# Patient Record
Sex: MALE | Race: WHITE | NOT HISPANIC OR LATINO | Employment: FULL TIME | URBAN - METROPOLITAN AREA
[De-identification: names, ages, dates, MRNs, and addresses within clinical notes are randomized per-mention and may not be internally consistent; named-entity substitution may affect disease eponyms.]

---

## 2020-09-26 ENCOUNTER — HOSPITAL ENCOUNTER (OUTPATIENT)
Dept: TELEMEDICINE | Facility: HOSPITAL | Age: 55
Discharge: HOME | End: 2020-09-26
Payer: COMMERCIAL

## 2020-09-26 ENCOUNTER — HOSPITAL ENCOUNTER (INPATIENT)
Facility: HOSPITAL | Age: 55
LOS: 2 days | Discharge: HOME | DRG: 065 | End: 2020-09-28
Attending: EMERGENCY MEDICINE | Admitting: ANESTHESIOLOGY
Payer: COMMERCIAL

## 2020-09-26 DIAGNOSIS — I63.9 STROKE: ICD-10-CM

## 2020-09-26 DIAGNOSIS — I63.311 CEREBROVASCULAR ACCIDENT (CVA) DUE TO THROMBOSIS OF RIGHT MIDDLE CEREBRAL ARTERY: ICD-10-CM

## 2020-09-26 DIAGNOSIS — I63.9 CEREBROVASCULAR ACCIDENT (CVA), UNSPECIFIED MECHANISM: Primary | ICD-10-CM

## 2020-09-26 DIAGNOSIS — I63.411 EMBOLIC STROKE INVOLVING RIGHT MIDDLE CEREBRAL ARTERY: ICD-10-CM

## 2020-09-26 PROBLEM — E78.5 HYPERLIPEMIA: Status: ACTIVE | Noted: 2020-09-26

## 2020-09-26 LAB
ABO + RH BLD: NORMAL
ALBUMIN SERPL BCP-MCNC: 3.7 G/DL (ref 3.5–5.2)
ALLENS TEST: ABNORMAL
ALP SERPL-CCNC: 62 U/L (ref 55–135)
ALT SERPL W/O P-5'-P-CCNC: 23 U/L (ref 10–44)
ANION GAP SERPL CALC-SCNC: 10 MMOL/L (ref 8–16)
AST SERPL-CCNC: 20 U/L (ref 10–40)
BASOPHILS # BLD AUTO: 0.03 K/UL (ref 0–0.2)
BASOPHILS NFR BLD: 0.3 % (ref 0–1.9)
BILIRUB SERPL-MCNC: 0.5 MG/DL (ref 0.1–1)
BILIRUB UR QL STRIP: NEGATIVE
BLD GP AB SCN CELLS X3 SERPL QL: NORMAL
BUN SERPL-MCNC: 17 MG/DL (ref 6–20)
CALCIUM SERPL-MCNC: 8.6 MG/DL (ref 8.7–10.5)
CHLORIDE SERPL-SCNC: 105 MMOL/L (ref 95–110)
CHOLEST SERPL-MCNC: 239 MG/DL (ref 120–199)
CHOLEST/HDLC SERPL: 5 {RATIO} (ref 2–5)
CLARITY UR REFRACT.AUTO: CLEAR
CO2 SERPL-SCNC: 25 MMOL/L (ref 23–29)
COLOR UR AUTO: NORMAL
CREAT SERPL-MCNC: 1.1 MG/DL (ref 0.5–1.4)
CREAT SERPL-MCNC: 1.1 MG/DL (ref 0.5–1.4)
CTP QC/QA: YES
DIFFERENTIAL METHOD: ABNORMAL
EOSINOPHIL # BLD AUTO: 0 K/UL (ref 0–0.5)
EOSINOPHIL NFR BLD: 0 % (ref 0–8)
ERYTHROCYTE [DISTWIDTH] IN BLOOD BY AUTOMATED COUNT: 12.9 % (ref 11.5–14.5)
EST. GFR  (AFRICAN AMERICAN): >60 ML/MIN/1.73 M^2
EST. GFR  (NON AFRICAN AMERICAN): >60 ML/MIN/1.73 M^2
ESTIMATED AVG GLUCOSE: 105 MG/DL (ref 68–131)
GLUCOSE SERPL-MCNC: 103 MG/DL (ref 70–110)
GLUCOSE UR QL STRIP: NEGATIVE
HBA1C MFR BLD HPLC: 5.3 % (ref 4–5.6)
HCO3 UR-SCNC: 27.3 MMOL/L (ref 24–28)
HCT VFR BLD AUTO: 44.5 % (ref 40–54)
HDLC SERPL-MCNC: 48 MG/DL (ref 40–75)
HDLC SERPL: 20.1 % (ref 20–50)
HGB BLD-MCNC: 14.5 G/DL (ref 14–18)
HGB UR QL STRIP: NEGATIVE
IMM GRANULOCYTES # BLD AUTO: 0.03 K/UL (ref 0–0.04)
IMM GRANULOCYTES NFR BLD AUTO: 0.3 % (ref 0–0.5)
INR PPP: 1.1 (ref 0.8–1.2)
KETONES UR QL STRIP: NEGATIVE
LDLC SERPL CALC-MCNC: 166 MG/DL (ref 63–159)
LEUKOCYTE ESTERASE UR QL STRIP: NEGATIVE
LYMPHOCYTES # BLD AUTO: 1.8 K/UL (ref 1–4.8)
LYMPHOCYTES NFR BLD: 15.5 % (ref 18–48)
MCH RBC QN AUTO: 30.2 PG (ref 27–31)
MCHC RBC AUTO-ENTMCNC: 32.6 G/DL (ref 32–36)
MCV RBC AUTO: 93 FL (ref 82–98)
MONOCYTES # BLD AUTO: 0.4 K/UL (ref 0.3–1)
MONOCYTES NFR BLD: 3.1 % (ref 4–15)
NEUTROPHILS # BLD AUTO: 9.3 K/UL (ref 1.8–7.7)
NEUTROPHILS NFR BLD: 80.8 % (ref 38–73)
NITRITE UR QL STRIP: NEGATIVE
NONHDLC SERPL-MCNC: 191 MG/DL
NRBC BLD-RTO: 0 /100 WBC
PCO2 BLDA: 44.2 MMHG (ref 35–45)
PH SMN: 7.4 [PH] (ref 7.35–7.45)
PH UR STRIP: 7 [PH] (ref 5–8)
PLATELET # BLD AUTO: 271 K/UL (ref 150–350)
PMV BLD AUTO: 10.3 FL (ref 9.2–12.9)
PO2 BLDA: 32 MMHG (ref 40–60)
POC BE: 3 MMOL/L
POC PTINR: 1 (ref 0.9–1.2)
POC PTWBT: 11.9 SEC (ref 9.7–14.3)
POC SATURATED O2: 60 % (ref 95–100)
POC TCO2: 29 MMOL/L (ref 24–29)
POCT GLUCOSE: 74 MG/DL (ref 70–110)
POCT GLUCOSE: 78 MG/DL (ref 70–110)
POTASSIUM SERPL-SCNC: 3.8 MMOL/L (ref 3.5–5.1)
PROT SERPL-MCNC: 7.1 G/DL (ref 6–8.4)
PROT UR QL STRIP: NEGATIVE
PROTHROMBIN TIME: 12.3 SEC (ref 9–12.5)
RBC # BLD AUTO: 4.8 M/UL (ref 4.6–6.2)
SAMPLE: ABNORMAL
SAMPLE: NORMAL
SAMPLE: NORMAL
SARS-COV-2 RDRP RESP QL NAA+PROBE: NEGATIVE
SITE: ABNORMAL
SODIUM SERPL-SCNC: 140 MMOL/L (ref 136–145)
SP GR UR STRIP: 1.01 (ref 1–1.03)
TRIGL SERPL-MCNC: 125 MG/DL (ref 30–150)
TSH SERPL DL<=0.005 MIU/L-ACNC: 1.7 UIU/ML (ref 0.4–4)
URN SPEC COLLECT METH UR: NORMAL
WBC # BLD AUTO: 11.43 K/UL (ref 3.9–12.7)

## 2020-09-26 PROCEDURE — 80061 LIPID PANEL: CPT

## 2020-09-26 PROCEDURE — U0002 COVID-19 LAB TEST NON-CDC: HCPCS | Performed by: EMERGENCY MEDICINE

## 2020-09-26 PROCEDURE — 81003 URINALYSIS AUTO W/O SCOPE: CPT

## 2020-09-26 PROCEDURE — 12000002 HC ACUTE/MED SURGE SEMI-PRIVATE ROOM

## 2020-09-26 PROCEDURE — 85610 PROTHROMBIN TIME: CPT

## 2020-09-26 PROCEDURE — 82803 BLOOD GASES ANY COMBINATION: CPT

## 2020-09-26 PROCEDURE — 85025 COMPLETE CBC W/AUTO DIFF WBC: CPT

## 2020-09-26 PROCEDURE — 93005 ELECTROCARDIOGRAM TRACING: CPT

## 2020-09-26 PROCEDURE — 99291 CRITICAL CARE FIRST HOUR: CPT | Mod: ,,, | Performed by: EMERGENCY MEDICINE

## 2020-09-26 PROCEDURE — G0427 PR INPT TELEHEALTH CON 70/>M: ICD-10-PCS | Mod: GT,ICN,, | Performed by: PSYCHIATRY & NEUROLOGY

## 2020-09-26 PROCEDURE — 82565 ASSAY OF CREATININE: CPT

## 2020-09-26 PROCEDURE — G0427 INPT/ED TELECONSULT70: HCPCS | Mod: GT,ICN,, | Performed by: PSYCHIATRY & NEUROLOGY

## 2020-09-26 PROCEDURE — 99291 CRITICAL CARE FIRST HOUR: CPT | Mod: 25

## 2020-09-26 PROCEDURE — 82962 GLUCOSE BLOOD TEST: CPT

## 2020-09-26 PROCEDURE — 93010 ELECTROCARDIOGRAM REPORT: CPT | Mod: ,,, | Performed by: INTERNAL MEDICINE

## 2020-09-26 PROCEDURE — 25000003 PHARM REV CODE 250: Performed by: STUDENT IN AN ORGANIZED HEALTH CARE EDUCATION/TRAINING PROGRAM

## 2020-09-26 PROCEDURE — 99223 PR INITIAL HOSPITAL CARE,LEVL III: ICD-10-PCS | Mod: ,,, | Performed by: PSYCHIATRY & NEUROLOGY

## 2020-09-26 PROCEDURE — 80053 COMPREHEN METABOLIC PANEL: CPT

## 2020-09-26 PROCEDURE — 99223 1ST HOSP IP/OBS HIGH 75: CPT | Mod: ,,, | Performed by: PSYCHIATRY & NEUROLOGY

## 2020-09-26 PROCEDURE — 84443 ASSAY THYROID STIM HORMONE: CPT

## 2020-09-26 PROCEDURE — 99291 PR CRITICAL CARE, E/M 30-74 MINUTES: ICD-10-PCS | Mod: ,,, | Performed by: EMERGENCY MEDICINE

## 2020-09-26 PROCEDURE — 86901 BLOOD TYPING SEROLOGIC RH(D): CPT

## 2020-09-26 PROCEDURE — 83036 HEMOGLOBIN GLYCOSYLATED A1C: CPT

## 2020-09-26 PROCEDURE — 93010 EKG 12-LEAD: ICD-10-PCS | Mod: ,,, | Performed by: INTERNAL MEDICINE

## 2020-09-26 RX ORDER — SODIUM CHLORIDE 0.9 % (FLUSH) 0.9 %
10 SYRINGE (ML) INJECTION
Status: DISCONTINUED | OUTPATIENT
Start: 2020-09-26 | End: 2020-09-28 | Stop reason: HOSPADM

## 2020-09-26 RX ORDER — ATORVASTATIN CALCIUM 20 MG/1
40 TABLET, FILM COATED ORAL DAILY
Status: DISCONTINUED | OUTPATIENT
Start: 2020-09-26 | End: 2020-09-28 | Stop reason: HOSPADM

## 2020-09-26 RX ORDER — ONDANSETRON 2 MG/ML
4 INJECTION INTRAMUSCULAR; INTRAVENOUS EVERY 8 HOURS PRN
Status: DISCONTINUED | OUTPATIENT
Start: 2020-09-26 | End: 2020-09-28 | Stop reason: HOSPADM

## 2020-09-26 RX ORDER — SODIUM,POTASSIUM PHOSPHATES 280-250MG
2 POWDER IN PACKET (EA) ORAL
Status: DISCONTINUED | OUTPATIENT
Start: 2020-09-26 | End: 2020-09-28

## 2020-09-26 RX ORDER — AMOXICILLIN 250 MG
1 CAPSULE ORAL 2 TIMES DAILY
Status: DISCONTINUED | OUTPATIENT
Start: 2020-09-26 | End: 2020-09-28 | Stop reason: HOSPADM

## 2020-09-26 RX ORDER — LANOLIN ALCOHOL/MO/W.PET/CERES
800 CREAM (GRAM) TOPICAL
Status: DISCONTINUED | OUTPATIENT
Start: 2020-09-26 | End: 2020-09-28

## 2020-09-26 RX ORDER — SODIUM CHLORIDE 9 MG/ML
INJECTION, SOLUTION INTRAVENOUS CONTINUOUS
Status: DISCONTINUED | OUTPATIENT
Start: 2020-09-26 | End: 2020-09-27

## 2020-09-26 RX ORDER — POTASSIUM CHLORIDE 1.5 G/1.58G
60 POWDER, FOR SOLUTION ORAL
Status: DISCONTINUED | OUTPATIENT
Start: 2020-09-26 | End: 2020-09-28

## 2020-09-26 RX ADMIN — SODIUM CHLORIDE: 0.9 INJECTION, SOLUTION INTRAVENOUS at 02:09

## 2020-09-26 RX ADMIN — ATORVASTATIN CALCIUM 40 MG: 20 TABLET, FILM COATED ORAL at 02:09

## 2020-09-26 RX ADMIN — SODIUM CHLORIDE 1000 ML: 0.9 INJECTION, SOLUTION INTRAVENOUS at 02:09

## 2020-09-26 RX ADMIN — DOCUSATE SODIUM 50MG AND SENNOSIDES 8.6MG 1 TABLET: 8.6; 5 TABLET, FILM COATED ORAL at 09:09

## 2020-09-26 NOTE — SUBJECTIVE & OBJECTIVE
Woke up with symptoms?: no    Recent bleeding noted: no  Does the patient take any Blood Thinners? no  Medications: No relevant medications      Past Medical History: no relevant history    Past Surgical History: no relevant surgical history    Family History: no relevant history    Social History: no smoking, no drinking, no drugs    Allergies:  No known drug allergies    Review of Systems   Neurological: Positive for facial asymmetry, speech difficulty and weakness.   All other systems reviewed and are negative.    Objective:   Vitals: There were no vitals taken for this visit. BP: 142/73, Respiratory Rate: 16 and Heart Rate: 105    CT READ: Yes  No hemmorhage. No mass effect. No early infarct signs. Scans noot available on PACS. Report read back per ED physician. Also, CTA reported as right MCA occlusion    Physical Exam  Constitutional:       Appearance: Normal appearance.   HENT:      Head: Normocephalic and atraumatic.   Eyes:      Extraocular Movements: Extraocular movements intact.      Pupils: Pupils are equal, round, and reactive to light.   Neurological:      Mental Status: He is alert and oriented to person, place, and time.

## 2020-09-26 NOTE — HPI
Was at work with sudden onset of left sided weakness, facial droop and slurred speech. Symptom onset 730am.     PMHx  None

## 2020-09-26 NOTE — ED NOTES
Patient tolerated regular diet well. Wife at bedside. Denies any requests. HOB elevated and side rails up x2. Continuous pulse oximetry, BP, and cardiac monitoring in place. Call bell within reach. Will continue to monitor.

## 2020-09-26 NOTE — SUBJECTIVE & OBJECTIVE
Past Medical History:   Diagnosis Date    Cancer     skin     Past Surgical History:   Procedure Laterality Date    SKIN CANCER EXCISION        No current facility-administered medications on file prior to encounter.      No current outpatient medications on file prior to encounter.      Allergies: Patient has no known allergies.    History reviewed. No pertinent family history.  Social History     Tobacco Use    Smoking status: Never Smoker    Smokeless tobacco: Never Used   Substance Use Topics    Alcohol use: Yes     Comment: beer socially    Drug use: Never     Review of Systems   Constitutional: Negative for chills and fever.   HENT: Negative for congestion and sore throat.    Eyes: Negative for visual disturbance.   Respiratory: Negative for cough and shortness of breath.    Cardiovascular: Negative for chest pain and palpitations.   Gastrointestinal: Negative for abdominal pain, nausea and vomiting.   Endocrine: Negative for polyuria.   Genitourinary: Negative for dysuria, frequency and urgency.   Musculoskeletal: Negative for arthralgias and myalgias.   Skin: Negative for wound.   Neurological: Negative for headaches.   Psychiatric/Behavioral: The patient is not nervous/anxious.      Objective:     Vitals:    Temp: 98.2 °F (36.8 °C)  Pulse: 96  BP: (!) 146/70  Resp: 16  SpO2: 96 %  O2 Device (Oxygen Therapy): room air    Temp  Min: 98.2 °F (36.8 °C)  Max: 98.8 °F (37.1 °C)  Pulse  Min: 93  Max: 102  BP  Min: 131/65  Max: 160/74  Resp  Min: 16  Max: 20  SpO2  Min: 96 %  Max: 99 %    No intake/output data recorded.           Physical Exam  Constitutional:       General: He is not in acute distress.     Appearance: He is obese. He is not ill-appearing, toxic-appearing or diaphoretic.   HENT:      Head: Normocephalic and atraumatic.      Mouth/Throat:      Mouth: Mucous membranes are moist.      Pharynx: Oropharynx is clear.   Eyes:      General: No scleral icterus.     Pupils: Pupils are equal, round, and  reactive to light.   Neck:      Musculoskeletal: Normal range of motion. No neck rigidity.   Cardiovascular:      Rate and Rhythm: Normal rate and regular rhythm.   Pulmonary:      Effort: Pulmonary effort is normal. No respiratory distress.   Abdominal:      General: Abdomen is flat. There is no distension.   Musculoskeletal:         General: No signs of injury.   Skin:     General: Skin is warm and dry.      Coloration: Skin is not jaundiced.   Neurological:      General: No focal deficit present.      Mental Status: He is alert and oriented to person, place, and time. Mental status is at baseline.      GCS: GCS eye subscore is 4. GCS verbal subscore is 5. GCS motor subscore is 6.      Cranial Nerves: Facial asymmetry present.      Motor: Motor function is intact.      Coordination: Coordination is intact.      Comments: Mild left facial droop  CN VII palsy, all other CN in tact.      Psychiatric:         Mood and Affect: Mood normal.           Today I personally reviewed pertinent imaging, laboratory results, notably:  Hypercholesterolemia

## 2020-09-26 NOTE — ASSESSMENT & PLAN NOTE
55 y.o. yo male with PMHx skin cancer and HLD who presented to [facility] with left side weakness, left facial droop, and dysarthria. LKN 0730. Telemedicine with Dr. Paz ; tPA given. CTA reviewed images as acquired. CTA completed at the outside hospital with possible RM1 occlusion. Patient transferred to [facility] to be evaluated for possible IR intervention. Upon arrival to [facility] ED, patient with improvement in symptoms. NIH SS 1. CT scan completed with early ischemic changes in right lateral frontal lobe and operculum. Given mild symptoms and early changes on CT- will defer intervention unless patient declines.  Admitted to Vascular Neurology for close monitoring/higher level of care.     Antithrombotics for secondary stroke prevention: Antiplatelets: None: Hold all Antithrombotics x 24 hours after IV t-PA administration    Statins for secondary stroke prevention and hyperlipidemia, if present:   Statins: Atorvastatin- 40 mg daily    Aggressive risk factor modification: history of cancer     Rehab efforts: The patient has been evaluated by a stroke team provider and the therapy needs have been fully considered based off the presenting complaints and exam findings. The following therapy evaluations are needed: PT evaluate and treat, OT evaluate and treat, SLP evaluate and treat, PM&R evaluate for appropriate placement    Diagnostics ordered/pending: HgbA1C to assess blood glucose levels, Lipid Profile to assess cholesterol levels, MRI head without contrast to assess brain parenchyma, TTE to assess cardiac function/status , TSH to assess thyroid function    VTE prophylaxis: None: Reason for No Pharmacological VTE Prophylaxis: Holding x 24 hours s/p treatment with alteplase (tPA)    BP parameters: Infarct: Post tPA, SBP <180

## 2020-09-26 NOTE — ED NOTES
Patient passed IZZY screen. Continuous pulse oximetry, BP, and cardiac monitoring in place. Call bell within reach. Will continue to monitor.

## 2020-09-26 NOTE — CARE UPDATE
FLIGHT CARE TRANSPORT NOTE     Date of Transport: 2020  : 1965  Age: 55 y.o.  Medication Dosing Weight: 93.9kg   Sex: male  Race: White    MRN: [id number]  Time Of Patient Handoff: 1210    ASSESSMENT/INTERVENTIONS     This patient was transported by [facility] from the ED of [facility] by [facility]. The patient's overall condition remained unchanged throughout transport, with all vital signs remaining stable per the patient's current baseline. All lines, tubes, and devices remained patent and intact. The patient was transferred from the [facility] Care stretcher to ER bed 10 where care was transitioned to bedside Fiona PARKER without incident. The patient brought no personal belongings and OSH Chart and Diagnostic Disk(s) were left with receiving clinician.     tPA completed at 1018 per referring facility.    Vital signs upon departure of flight team:  HR: 92 NSR  BP: 131/65 (87)  SpO2: 98 RA  RR: 17        FOLLOW-UP     Call [facility], Evelyn Liz RN at [phone] for additional questions or concerns.

## 2020-09-26 NOTE — ED NOTES
Patient presents via [facility] from [facility] for evaluation of stroke. Reported M1 Occlusion/ JESSENIA occlusion.   tPA given: bolus: 8.5mg - 0920                   Infusion - 76 mg - 0922 - 1018 stop.  Patient has resolved slurred speech, expressive aphasia. Left-sided droop still present.

## 2020-09-26 NOTE — ED NOTES
Food tray delivered. Wife at bedside. HOB elevated and side rails up x2. Continuous pulse oximetry, BP, and cardiac monitoring in place. Call bell within reach. Will continue to monitor.

## 2020-09-26 NOTE — ED PROVIDER NOTES
Encounter Date: 9/26/2020       History     Chief Complaint   Patient presents with    Cerebrovascular Accident     Pt is a transfer from [facility] for evaluation of CVA. LKN 0715 this AM. presented with Aphasia, slurred speech and facial droop. TPA end at 10:18am     HPI   55-year-old male with past medical history of skin cancer, coming in sent in from outside hospital for CVA.  Last known well was 715 this morning.  Came in with slurred speech, left facial droop, left-sided weakness.  No numbness.  You are given tPA at 10:18 a.m. in transfer to [facility] for further evaluation, possible intervention.  CT scan of the brain showed a possible dense MCA sign on the right.  Upon arrival to the emergency department he feels like his speech is significantly improved, his weakness seems to have resolved.  Denies chest pain, shortness of breath, abdominal pain headache or any other symptoms at this time.    Review of patient's allergies indicates:  No Known Allergies  Past Medical History:   Diagnosis Date    Cancer     skin     Past Surgical History:   Procedure Laterality Date    SKIN CANCER EXCISION       History reviewed. No pertinent family history.  Social History     Tobacco Use    Smoking status: Never Smoker    Smokeless tobacco: Never Used   Substance Use Topics    Alcohol use: Yes     Comment: beer socially    Drug use: Never     Review of Systems  Constitutional:  No Fever, No Chills,   Eyes: No Vision Changes  ENT/Mouth: No sore throat, No rhinorrhea  Cardiovascular:  No Chest Pain, No Palpitations  Respiratory:  No Cough, No SOB  Gastrointestinal:  No Nausea, No Vomiting, No Diarrhea, No abdo pain.  Genitourinary:  No  pain, No dysuria   Musculoskeletal:  No Arthralgias, No Back Pain, No Neck Pain, No recent trauma.  Skin:  No skin Lesions  Neuro:  Mildly slurred speech, No Numbness, No Paresthesias, No Dizziness, No Headache        Physical Exam     Initial Vitals   BP Pulse Resp  Temp SpO2   09/26/20 1214 09/26/20 1214 09/26/20 1214 09/26/20 1220 09/26/20 1214   131/65 93 18 98.2 °F (36.8 °C) 97 %      MAP       --                Physical Exam  Physical Exam:  CONSTITUTIONAL: Well developed, well nourished, in no acute distress.  HENT: Normocephalic, atraumatic    EYES: Sclerae anicteric   NECK: Supple, no thyroid enlargement  CARDIOVASCULAR: Regular rate and rhythm without any murmurs, gallops, rubs.  RESPIRATORY: Speaking in full sentences. Breathing comfortably. Auscultation of the lungs revealed normal breath sounds b/l, no wheezing, no rales, no rhonchi.  ABDOMEN: Soft and nontender, no masses, no rebound or guarding   NEUROLOGIC: Alert, interacting normally.  Subtle left facial droop.  Mildly slurred speech.  5/5 strength bilaterally upper and lower extremities.  MSK: Moving all four extremities.  Skin: Warm and dry. No visible rash on exposed areas of skin.    Psych: Mood and affect normal.       ED Course   Procedures  Labs Reviewed   CBC W/ AUTO DIFFERENTIAL - Abnormal; Notable for the following components:       Result Value    Gran # (ANC) 9.3 (*)     Gran% 80.8 (*)     Lymph% 15.5 (*)     Mono% 3.1 (*)     All other components within normal limits   COMPREHENSIVE METABOLIC PANEL - Abnormal; Notable for the following components:    Calcium 8.6 (*)     All other components within normal limits   LIPID PANEL - Abnormal; Notable for the following components:    Cholesterol 239 (*)     LDL Cholesterol 166.0 (*)     All other components within normal limits   ISTAT PROCEDURE - Abnormal; Notable for the following components:    POC PO2 32 (*)     POC SATURATED O2 60 (*)     All other components within normal limits   PROTIME-INR   TSH   URINALYSIS, REFLEX TO URINE CULTURE    Narrative:     Specimen Source->Urine   HEMOGLOBIN A1C   SARS-COV-2 RDRP GENE   TYPE & SCREEN   ISTAT CREATININE   ISTAT PROCEDURE   POCT GLUCOSE   POCT GLUCOSE   POCT GLUCOSE MONITORING CONTINUOUS          Imaging  Results           CT Head Without Contrast (Final result)  Result time 09/26/20 13:26:02    Final result by Kaleb Roth MD (09/26/20 13:26:02)                 Impression:      Subtle hypoattenuation in the right lateral frontal lobe and operculum suspicious for early ischemic changes.  Recommend MRI brain for further evaluation.    Findings were discussed with Aissatou Fernández at 12:25 on9/26/2020 by Radiology resident Jayshree Richter MD.    This report was flagged in Epic as abnormal.    Electronically signed by resident: Jayshree Richter  Date:    09/26/2020  Time:    12:19    Electronically signed by: Kaleb Roth MD  Date:    09/26/2020  Time:    13:26             Narrative:    EXAMINATION:  CT HEAD WITHOUT CONTRAST    CLINICAL HISTORY:  stroke;    TECHNIQUE:  Low dose axial CT images obtained throughout the head without the use of intravenous contrast.  Axial, sagittal and coronal reconstructions were performed.    COMPARISON:  None.    FINDINGS:  Intracranial compartment:    Ventricles and sulci are normal in size for age without evidence of hydrocephalus.    There is subtle hypoattenuation in the right lateral frontal lobe and operculum suspicious for early ischemic changes.  There are also few scattered hypodensities in the supratentorial white matter, likely sequela of chronic microvascular disease.  No parenchymal mass, hemorrhage or edema.    No extra-axial blood or fluid collections.    Skull/extracranial contents (limited evaluation):    No fracture. Mastoid air cells and paranasal sinuses are essentially clear.                                 Medical Decision Making:   History:   Old Medical Records: I decided to obtain old medical records.    55-year-old male with past medical history as noted coming in with improving left-sided weakness and slurred speech since receiving tPA.  Exam as noted above positive for some mild left-sided facial droop as well as some slurred speech.  Repeat CT done in  the emergency department.  Vascular neuro at bedside and evaluate patient.  He is not vascular intervention candidate at this time.  You will be admitted to Neuro ICU for close monitoring, serial neuro checks, and continued care and management.    Rest of exam is otherwise benign.  Stroke labs ordered.    This was explained to patient, verbalizes understanding of plan.    Critical Care Time:     The high probability of sudden, clinically significant deterioration in the patient's condition required the highest level of my preparedness to intervene urgently.    Services included the following: chart data review, reviewing nursing notes and/or old charts, documentation time, consultant collaboration regarding findings and treatment options, medication orders and management, direct patient care, vital sign assessments and ordering, interpreting and reviewing diagnostic studies/lab tests.     I spent 30 minutes on total Critical Care time, which includes only time during which I was engaged in work directly related to the patient's care, as described above, whether at the bedside or elsewhere in the Emergency Department.  It did not include time spent on separately billable procedures nor did it include the time spent by residents, students, nurses or physician assistants on this patient's care.    Critical Care was needed secondary to the following conditions: Stroke.                                Clinical Impression:     ICD-10-CM ICD-9-CM   1. Cerebrovascular accident (CVA), unspecified mechanism  I63.9 434.91   2. Stroke  I63.9 434.91                          ED Disposition Condition    Admit                             Franko Obrien MD  09/26/20 [id number]

## 2020-09-26 NOTE — CONSULTS
[facility]  Vascular Neurology  Comprehensive Stroke Center  Tele-Consultation Note      Consults    Consulting Provider: ROLANDO BALDWIN  Current Providers  No providers found    Patient [facility] Department  Spoke hospital nurse at bedside with patient assisting consultant.     Patient information was obtained from patient and spouse/SO.         Assessment/Plan:     STROKE DOCUMENTATION     Acute Stroke Times:   Acute Stroke Times   Symptom Onset Date: 09/26/20  Symptom Onset Time: 0730  Stroke Team Arrival Date: 09/26/20(Delay due to patient going for CTA)  Stroke Team Arrival Time: 0903    NIH Scale:  Interval: baseline  1a. Level of Consciousness: 0-->Alert, keenly responsive  1b. LOC Questions: 0-->Answers both questions correctly  1c. LOC Commands: 0-->Performs both tasks correctly  2. Best Gaze: 0-->Normal  3. Visual: 0-->No visual loss  4. Facial Palsy: 0-->Normal symmetrical movements  5a. Motor Arm, Left: 0-->No drift, limb holds 90 (or 45) degrees for full 10 secs  5b. Motor Arm, Right: 0-->No drift, limb holds 90 (or 45) degrees for full 10 secs  6a. Motor Leg, Left: 0-->No drift, leg holds 30 degree position for full 5 secs  6b. Motor Leg, Right: 0-->No drift, leg holds 30 degree position for full 5 secs  7. Limb Ataxia: 0-->Absent  8. Sensory: 0-->Normal, no sensory loss  9. Best Language: 0-->No aphasia, normal  10. Dysarthria: 0-->Normal  11. Extinction and Inattention (formerly Neglect): 1-->Visual, tactile, auditory, spatial, or personal inattention or extinction to bilateral simultaneous stimulation in one of the sensory modalities  Total (NIH Stroke Scale): 1     Modified Nome Score: 0  Thuan Coma Scale:15   ABCD2 Score:    [id number] Score:   HAS -BLED Score:   ICH Score:   Hunt & Tam Classification:       Diagnoses:   Cerebrovascular accident (CVA) due to thrombosis of  right middle cerebral artery    Antithrombotics for secondary stroke prevention: Antiplatelets: None: Hold all Antithrombotics x 24 hours after IV t-PA administration    Statins for secondary stroke prevention and hyperlipidemia, if present:   Statins: Atorvastatin- 80 mg daily    Aggressive risk factor modification: None     Rehab efforts: The patient has been evaluated by a stroke team provider and the therapy needs have been fully considered based off the presenting complaints and exam findings. The following therapy evaluations are needed: PT evaluate and treat, OT evaluate and treat, SLP evaluate and treat    Diagnostics ordered/pending: CTA Head to assess vasculature , CTA Neck/Arch to assess vasculature, HgbA1C to assess blood glucose levels, Lipid Profile to assess cholesterol levels, MRI head without contrast to assess brain parenchyma, TTE to assess cardiac function/status , TSH to assess thyroid function    VTE prophylaxis: None: Reason for No Pharmacological VTE Prophylaxis: Holding x 24 hours s/p treatment with alteplase (tPA)    BP parameters: Infarct: Post tPA, SBP <180            There were no vitals taken for this visit.  Alteplase Eligible?: Yes  Alteplase Recommendation:   Alteplase Total Dose:   Total dose: Alteplase 0.9mg/kg (max dose:90mg)                      ** based on acquired weight from facility   Bolus Dose:   10% of total Alteplase dose given intravenously over 1 minute   Continuous Infusion Dose:   Remaining 90% of total Alteplase dose infused intravenously over 60 minutes    **infusion must start at the same time as the bolus dose     Additional Recommendations:   1. Neurological assessment and vital signs (except temperature) every 15 minutes during Altaplase infusion.  2. Frequency of BP assessments may need to be increased if systolic BP stays >= 180 mm Hg or diastolic BP stays >= 105 mm Hg. Administer antihypertensive meds as ordered  3. Continue to monitor and control blood  pressure and monitor for neurological deterioration every 15 minutes for the first hour after the infusion is stopped. Then every 30 minutes for the next 6 hours. Perform hourly monitoring from the 8th post-infusion hour until 24 hours post-infusion.  4. Temperature every 4 hours or as required.  5. Follow hospital protocol for further orders re: post tPA infusion patient management.  6. No antithrombotics or anticoagulants (including but not limited to: heparin, warfarin, aspirin, clopidigrel, or dipyridamole) for 24 hours, then start antithrombotics as ordered by treating physician    Adapted from the American Heart Association/American Stroke Association (AHA/ASA) and American Association of Neuroscience Nurses (AANN) Guidelines.   Possible Interventional Revascularization Candidate? Yes    Disposition Recommendation: transfer to [facility] by  air  stat    Subjective:     History of Present Illness:  Was at work with sudden onset of left sided weakness, facial droop and slurred speech. Symptom onset 730am.     PMHx  None      Woke up with symptoms?: no    Recent bleeding noted: no  Does the patient take any Blood Thinners? no  Medications: No relevant medications      Past Medical History: no relevant history    Past Surgical History: no relevant surgical history    Family History: no relevant history    Social History: no smoking, no drinking, no drugs    Allergies:  No known drug allergies    Review of Systems   Neurological: Positive for facial asymmetry, speech difficulty and weakness.   All other systems reviewed and are negative.    Objective:   Vitals: There were no vitals taken for this visit. BP: 142/73, Respiratory Rate: 16 and Heart Rate: 105    CT READ: Yes  No hemmorhage. No mass effect. No early infarct signs. Scans noot available on PACS. Report read back per ED physician. Also, CTA reported as right MCA occlusion    Physical Exam  Constitutional:       Appearance: Normal appearance.    HENT:      Head: Normocephalic and atraumatic.   Eyes:      Extraocular Movements: Extraocular movements intact.      Pupils: Pupils are equal, round, and reactive to light.   Neurological:      Mental Status: He is alert and oriented to person, place, and time.               Recommended the emergency room physician to have a brief discussion with the patient and/or family if available regarding the risks and benefits of treatment, and to briefly document the occurrence of that discussion in his clinical encounter note.     The attending portion of this evaluation, treatment, and documentation was performed per Ty Paz MD via audiovisual.    Billing code:  (time dependent stroke, complex case, unstable patient, hemorrhages, any intervention, some mimics)    · This patient has a very critical neurological condition/illness, with very high morbidity and mortality.  · There is a very high probability for acute neurological change leading to clinical and possibly life-threatening deterioration requiring highest level of physician preparedness for urgent intervention.  · There is possibility that this condition will require treatment with high risk medications as quickly as possible.  · There is also a possibility that the patient may benefit from further, more advance complex therapies (e.g. endovascular therapy) that will require prompt diagnosis and care.  · Care was coordinated with other physicians involved in the patient's care.  · Radiologic studies and laboratory data were reviewed and interpreted, and plan of care was re-assessed based on the results.  · Diagnosis, treatment options and prognosis may have been discussed with the patient and/or family members or caregiver.  · Further advanced medical management and further evaluation is warranted for his care.      In your opinion, this was a: Tier 1 Van Positive    Consult End Time: 9:16 AM     Ty Paz MD  Comprehensive Stroke  Center  Vascular Neurology   [facility]

## 2020-09-26 NOTE — ED NOTES
"Patient identifiers for Dane Rosen 55 y.o. male checked and correct.  Chief Complaint   Patient presents with    Cerebrovascular Accident     Pt is a transfer from [facility] for evaluation of CVA. LKN 0715 this AM. presented with Aphasia, slurred speech and facial droop. TPA end at 10:18am     Past Medical History:   Diagnosis Date    Cancer     skin     Allergies reported: Review of patient's allergies indicates:  No Known Allergies      LOC: Patient is awake, alert, and aware of environment with an appropriate affect. Patient is oriented x 4 and speaking appropriately. Speech and clear and patient answers questions appropriately. Patient reports with symptoms onset around 0730 this morning he had difficulty swallowing and slurred speech. Symptoms resolved at this time.  APPEARANCE: Patient resting comfortably and in no acute distress. Patient is clean and well groomed, patient's clothing is properly fastened.  HEENT: Wearing mask. Denies visual loss.   SKIN: The skin is warm and dry. Patient has normal skin turgor and moist mucus membranes. Denies fever or chills.  MUSKULOSKELETAL: Patient is moving all extremities well, no obvious deformities noted. Pulses intact. Patient reports difficulty ambulating this morning, "My coordination was off." Left-sided weakness reported, but patient states it has resolved.  RESPIRATORY: Airway is open and patent. Respirations are spontaneous and non-labored with normal effort and rate. Denies Sob. 97% oxygen saturation on room air.   CARDIAC: Patient has a normal rate and rhythm. 97 on cardiac monitor. No peripheral edema noted. Denies chest pain.  ABDOMEN: No distention noted. Soft and non-tender upon palpation. Denies n/v/d.   NEUROLOGICAL: pupils 3mm, PERRL. Facial expression is asymmetrical; left-sided droop to mouth and eyebrow. Hand grasps are equal bilaterally. Normal sensation in all extremities when touched with finger. Denies headache.            "

## 2020-09-26 NOTE — CONSULTS
[facility]  Vascular Neurology  Comprehensive Stroke Center  Consult Note    Inpatient consult to Vascular (Stroke) Neurology  Consult performed by: Aissatou Fernández NP  Consult ordered by: Franko Obrien MD        Assessment/Plan:     Patient is a 55 y.o. year old male with:    * Embolic stroke involving right middle cerebral artery  55 y.o. yo male with PMHx skin cancer and HLD who presented to [facility] with left side weakness, left facial droop, and dysarthria. LKN 0730. Telemedicine with Dr. Paz ; tPA given. CTA reviewed images as acquired. CTA completed at the outside hospital with possible RM1 occlusion. Patient transferred to [facility] to be evaluated for possible IR intervention. Upon arrival to [facility] ED, patient with improvement in symptoms. NIH SS 1. CT scan completed with early ischemic changes in right lateral frontal lobe and operculum. Given mild symptoms and early changes on CT- will defer intervention unless patient declines.  Admitted to Vascular Neurology for close monitoring/higher level of care.     Antithrombotics for secondary stroke prevention: Antiplatelets: None: Hold all Antithrombotics x 24 hours after IV t-PA administration    Statins for secondary stroke prevention and hyperlipidemia, if present:   Statins: Atorvastatin- 40 mg daily    Aggressive risk factor modification: history of cancer     Rehab efforts: The patient has been evaluated by a stroke team provider and the therapy needs have been fully considered based off the presenting complaints and exam findings. The following therapy evaluations are needed: PT evaluate and treat, OT evaluate and treat, SLP evaluate and treat, PM&R evaluate for appropriate placement    Diagnostics ordered/pending: HgbA1C to assess blood glucose levels, Lipid Profile to assess cholesterol levels, MRI head without contrast to assess brain parenchyma, TTE to assess cardiac function/status , TSH to assess thyroid  function    VTE prophylaxis: None: Reason for No Pharmacological VTE Prophylaxis: Holding x 24 hours s/p treatment with alteplase (tPA)    BP parameters: Infarct: Post tPA, SBP <180        Hyperlipemia  -stroke risk factor    Atorvastatin 40 mg        STROKE DOCUMENTATION     Acute Stroke Times   Last Known Normal Date: 09/26/20  Last Known Normal Time: 0730  Symptom Onset Date: 09/26/20  Symptom Onset Time: 0730  Stroke Team Called Date: 09/26/20  Stroke Team Called Time: 1213  Stroke Team Arrival Date: 09/26/20  Stroke Team Arrival Time: 1217  CT Interpretation Time: 1222  Decision to Treat Time for Alteplase: (N/A done at OSH)  Decision to Treat Time for IR: (N/A)    NIH Scale:  1a. Level of Consciousness: 0-->Alert, keenly responsive  1b. LOC Questions: 0-->Answers both questions correctly  1c. LOC Commands: 0-->Performs both tasks correctly  2. Best Gaze: 0-->Normal  3. Visual: 0-->No visual loss  4. Facial Palsy: 0-->Normal symmetrical movements  5a. Motor Arm, Left: 0-->No drift, limb holds 90 (or 45) degrees for full 10 secs  5b. Motor Arm, Right: 0-->No drift, limb holds 90 (or 45) degrees for full 10 secs  6a. Motor Leg, Left: 0-->No drift, leg holds 30 degree position for full 5 secs  6b. Motor Leg, Right: 0-->No drift, leg holds 30 degree position for full 5 secs  7. Limb Ataxia: 0-->Absent  8. Sensory: 0-->Normal, no sensory loss  9. Best Language: 0-->No aphasia, normal  10. Dysarthria: 1-->Mild-to-moderate dysarthria, patient slurs at least some words and, at worst, can be understood with some difficulty  11. Extinction and Inattention (formerly Neglect): 0-->No abnormality  Total (NIH Stroke Scale): 1    Modified Arturo Score: 0  Thuan Coma Scale:15   ABCD2 Score:    [id number] Score:   HAS -BLED Score:   ICH Score:   Hunt & Tam Classification:       Thrombolysis Candidate? Yes, given prior to arrival at outside hospital    Delays to Thrombolysis?  No    Interventional Revascularization  "Candidate?   Is the patient eligible for mechanical endovascular reperfusion (YANG)?  not currently, NIHSS 1      Hemorrhagic change of an Ischemic Stroke: Does this patient have an ischemic stroke with hemorrhagic changes? No     Subjective:     History of Present Illness:  Dane Rosen is a 55 y.o. male with PMHx skin cancer who is being evaluated by the Vascular Neurology service after developing left side weakness, left facial droop and slurred speech. Pt was LKN at approximately 7:30 am. Paitent had been alone in office, went to drink and had difficulty swallowing. After coughing spell, he suddenly became weak on left side. He then noticed his speech was slurred and face was drooping on the left side. Patient called his wife. EMS was activated. Patient was transported to [facility]. Telestroke consult done- tPA given. CTA completed at the outside hospital with possible RM1 occlusion. Patient transferred to [facility] to be evaluated for possible IR intervention.     Pt denies personal hx blood clots, denies hx cardiac arrhythmia and denies use of antithrombotics at home Patient reports being told he has "thick blood" when having labs drawn. He lives with others and performs all ADLs independently. Pt drinks alcohol occasionally.            Past Medical History:   Diagnosis Date    Cancer     skin     Past Surgical History:   Procedure Laterality Date    SKIN CANCER EXCISION       History reviewed. No pertinent family history.  Social History     Tobacco Use    Smoking status: Never Smoker    Smokeless tobacco: Never Used   Substance Use Topics    Alcohol use: Yes     Comment: beer socially    Drug use: Never     Review of patient's allergies indicates:  No Known Allergies    Medications: I have reviewed the current medication administration record.    (Not in a hospital admission)      Review of Systems   Constitutional: Positive for activity change.   HENT: Positive for voice change.    Eyes: " Negative for visual disturbance.   Respiratory: Negative for shortness of breath and wheezing.    Cardiovascular: Negative for chest pain and palpitations.   Gastrointestinal: Negative for nausea and vomiting.   Genitourinary: Negative for difficulty urinating.   Musculoskeletal: Negative for gait problem.   Skin: Negative for color change and pallor.   Neurological: Positive for speech difficulty (slightly slurred). Negative for facial asymmetry, weakness and light-headedness.   Psychiatric/Behavioral: Negative for confusion and decreased concentration.     Objective:     Vital Signs (Most Recent):  Temp: 98.2 °F (36.8 °C) (09/26/20 1220)  Pulse: 91 (09/26/20 1450)  Resp: 20 (09/26/20 1450)  BP: (!) 144/70 (09/26/20 1450)  SpO2: 97 % (09/26/20 1450)    Vital Signs Range (Last 24H):  Temp:  [98.2 °F (36.8 °C)-98.8 °F (37.1 °C)]   Pulse:  []   Resp:  [16-20]   BP: (131-160)/(50-77)   SpO2:  [96 %-99 %]     Physical Exam  HENT:      Head: Normocephalic and atraumatic.      Nose: Nose normal.      Mouth/Throat:      Mouth: Mucous membranes are moist.      Pharynx: Oropharynx is clear.   Eyes:      Pupils: Pupils are equal, round, and reactive to light.   Neck:      Musculoskeletal: Normal range of motion and neck supple.   Cardiovascular:      Rate and Rhythm: Normal rate.   Abdominal:      General: Abdomen is flat.   Musculoskeletal: Normal range of motion.   Skin:     General: Skin is warm.   Neurological:      Mental Status: He is alert.      Cranial Nerves: Dysarthria (very mild) present.         Neurological Exam:   LOC: alert  Attention Span: Good   Language: No aphasia  Articulation: very mild dysarthria   Orientation: Person, Place, Time   Visual Fields: Full  EOM (CN III, IV, VI): Full/intact  Pupils (CN II, III): PERRL  Facial Sensation (CN V): Normal  Facial Movement (CN VII): Symmetric facial expression    Motor: Arm left  Normal 5/5  Leg left  Normal 5/5  Arm right  Normal 5/5  Leg right Normal  5/5  Cebellar: No evidence of appendicular or axial ataxia  Sensation: Intact to light touch, temperature and vibration  Tone: Normal tone throughout      Laboratory:  CMP:   Recent Labs   Lab 09/26/20  1240   CALCIUM 8.6*   ALBUMIN 3.7   PROT 7.1      K 3.8   CO2 25      BUN 17   CREATININE 1.1   ALKPHOS 62   ALT 23   AST 20   BILITOT 0.5     CBC:   Recent Labs   Lab 09/26/20  1240   WBC 11.43   RBC 4.80   HGB 14.5   HCT 44.5      MCV 93   MCH 30.2   MCHC 32.6     Lipid Panel:   Recent Labs   Lab 09/26/20  1240   CHOL 239*   LDLCALC 166.0*   HDL 48   TRIG 125     Coagulation:   Recent Labs   Lab 09/26/20  1240   INR 1.1     Hgb A1C:   Recent Labs   Lab 09/26/20  1407   HGBA1C 5.3     TSH:   Recent Labs   Lab 09/26/20  1240   TSH 1.702       Diagnostic Results:      Brain imaging:    CT scan 9/26/20  Subtle hypoattenuation in the right lateral frontal lobe and operculum suspicious for early ischemic changes.  Recommend MRI brain for further evaluation.         Vessel Imaging:    [facility] CTA 9/26/20  R M1 occlusion    Cardiac Evaluation:     TTE pending       Aissatou Fernández NP  [facility] Stroke [facility]  Department of Vascular Neurology   [facility]

## 2020-09-26 NOTE — ASSESSMENT & PLAN NOTE
Antithrombotics for secondary stroke prevention: Antiplatelets: None: Hold all Antithrombotics x 24 hours after IV t-PA administration    Statins for secondary stroke prevention and hyperlipidemia, if present:   Statins: Atorvastatin- 80 mg daily    Aggressive risk factor modification: None     Rehab efforts: The patient has been evaluated by a stroke team provider and the therapy needs have been fully considered based off the presenting complaints and exam findings. The following therapy evaluations are needed: PT evaluate and treat, OT evaluate and treat, SLP evaluate and treat    Diagnostics ordered/pending: CTA Head to assess vasculature , CTA Neck/Arch to assess vasculature, HgbA1C to assess blood glucose levels, Lipid Profile to assess cholesterol levels, MRI head without contrast to assess brain parenchyma, TTE to assess cardiac function/status , TSH to assess thyroid function    VTE prophylaxis: None: Reason for No Pharmacological VTE Prophylaxis: Holding x 24 hours s/p treatment with alteplase (tPA)    BP parameters: Infarct: Post tPA, SBP <180

## 2020-09-26 NOTE — ASSESSMENT & PLAN NOTE
Admit to [facility]  -Neuro checks q1hr, vital signs q1hr  -SBP goal < 180  -Hold AC/AP at this time  -Vascular neurology following  -Atorvastatin  -Lipid panel, TSH, HgbA1c  -Mechanical SCDs  -PT/OT/SLP  -1 liter NS bolus + 100mL/hr NS  -MRI Brain tomorrow AM

## 2020-09-26 NOTE — HOSPITAL COURSE
9/26- Transfer from [facility] for R MCA stroke with aphasia, dysphagia, ataxia, and facial droop. tPA end time 1018. Post tPA at baseline with no residual deficits.   9/27: MRI brain completed, on asa and sq heparin, d/c IVF,stepdown to stroke today

## 2020-09-26 NOTE — SUBJECTIVE & OBJECTIVE
Past Medical History:   Diagnosis Date    Cancer     skin     Past Surgical History:   Procedure Laterality Date    SKIN CANCER EXCISION       History reviewed. No pertinent family history.  Social History     Tobacco Use    Smoking status: Never Smoker    Smokeless tobacco: Never Used   Substance Use Topics    Alcohol use: Yes     Comment: beer socially    Drug use: Never     Review of patient's allergies indicates:  No Known Allergies    Medications: I have reviewed the current medication administration record.    (Not in a hospital admission)      Review of Systems   Constitutional: Positive for activity change.   HENT: Positive for voice change.    Eyes: Negative for visual disturbance.   Respiratory: Negative for shortness of breath and wheezing.    Cardiovascular: Negative for chest pain and palpitations.   Gastrointestinal: Negative for nausea and vomiting.   Genitourinary: Negative for difficulty urinating.   Musculoskeletal: Negative for gait problem.   Skin: Negative for color change and pallor.   Neurological: Positive for speech difficulty (slightly slurred). Negative for facial asymmetry, weakness and light-headedness.   Psychiatric/Behavioral: Negative for confusion and decreased concentration.     Objective:     Vital Signs (Most Recent):  Temp: 98.2 °F (36.8 °C) (09/26/20 1220)  Pulse: 91 (09/26/20 1450)  Resp: 20 (09/26/20 1450)  BP: (!) 144/70 (09/26/20 1450)  SpO2: 97 % (09/26/20 1450)    Vital Signs Range (Last 24H):  Temp:  [98.2 °F (36.8 °C)-98.8 °F (37.1 °C)]   Pulse:  []   Resp:  [16-20]   BP: (131-160)/(50-77)   SpO2:  [96 %-99 %]     Physical Exam  HENT:      Head: Normocephalic and atraumatic.      Nose: Nose normal.      Mouth/Throat:      Mouth: Mucous membranes are moist.      Pharynx: Oropharynx is clear.   Eyes:      Pupils: Pupils are equal, round, and reactive to light.   Neck:      Musculoskeletal: Normal range of motion and neck supple.   Cardiovascular:      Rate  and Rhythm: Normal rate.   Abdominal:      General: Abdomen is flat.   Musculoskeletal: Normal range of motion.   Skin:     General: Skin is warm.   Neurological:      Mental Status: He is alert.      Cranial Nerves: Dysarthria (very mild) present.         Neurological Exam:   LOC: alert  Attention Span: Good   Language: No aphasia  Articulation: very mild dysarthria   Orientation: Person, Place, Time   Visual Fields: Full  EOM (CN III, IV, VI): Full/intact  Pupils (CN II, III): PERRL  Facial Sensation (CN V): Normal  Facial Movement (CN VII): Symmetric facial expression    Motor: Arm left  Normal 5/5  Leg left  Normal 5/5  Arm right  Normal 5/5  Leg right Normal 5/5  Cebellar: No evidence of appendicular or axial ataxia  Sensation: Intact to light touch, temperature and vibration  Tone: Normal tone throughout      Laboratory:  CMP:   Recent Labs   Lab 09/26/20  1240   CALCIUM 8.6*   ALBUMIN 3.7   PROT 7.1      K 3.8   CO2 25      BUN 17   CREATININE 1.1   ALKPHOS 62   ALT 23   AST 20   BILITOT 0.5     CBC:   Recent Labs   Lab 09/26/20  1240   WBC 11.43   RBC 4.80   HGB 14.5   HCT 44.5      MCV 93   MCH 30.2   MCHC 32.6     Lipid Panel:   Recent Labs   Lab 09/26/20  1240   CHOL 239*   LDLCALC 166.0*   HDL 48   TRIG 125     Coagulation:   Recent Labs   Lab 09/26/20  1240   INR 1.1     Hgb A1C:   Recent Labs   Lab 09/26/20  1407   HGBA1C 5.3     TSH:   Recent Labs   Lab 09/26/20  1240   TSH 1.702       Diagnostic Results:      Brain imaging:    CT scan 9/26/20  Subtle hypoattenuation in the right lateral frontal lobe and operculum suspicious for early ischemic changes.  Recommend MRI brain for further evaluation.         Vessel Imaging:    [facility] CTA 9/26/20  R M1 occlusion    Cardiac Evaluation:     TTE pending

## 2020-09-27 LAB
ALBUMIN SERPL BCP-MCNC: 3.1 G/DL (ref 3.5–5.2)
ALP SERPL-CCNC: 52 U/L (ref 55–135)
ALT SERPL W/O P-5'-P-CCNC: 19 U/L (ref 10–44)
ANION GAP SERPL CALC-SCNC: 7 MMOL/L (ref 8–16)
AST SERPL-CCNC: 15 U/L (ref 10–40)
BASOPHILS # BLD AUTO: 0.03 K/UL (ref 0–0.2)
BASOPHILS NFR BLD: 0.4 % (ref 0–1.9)
BILIRUB SERPL-MCNC: 0.5 MG/DL (ref 0.1–1)
BUN SERPL-MCNC: 12 MG/DL (ref 6–20)
CALCIUM SERPL-MCNC: 7.7 MG/DL (ref 8.7–10.5)
CHLORIDE SERPL-SCNC: 112 MMOL/L (ref 95–110)
CO2 SERPL-SCNC: 20 MMOL/L (ref 23–29)
CREAT SERPL-MCNC: 0.9 MG/DL (ref 0.5–1.4)
DIFFERENTIAL METHOD: ABNORMAL
EOSINOPHIL # BLD AUTO: 0 K/UL (ref 0–0.5)
EOSINOPHIL NFR BLD: 0.5 % (ref 0–8)
ERYTHROCYTE [DISTWIDTH] IN BLOOD BY AUTOMATED COUNT: 13.2 % (ref 11.5–14.5)
EST. GFR  (AFRICAN AMERICAN): >60 ML/MIN/1.73 M^2
EST. GFR  (NON AFRICAN AMERICAN): >60 ML/MIN/1.73 M^2
GLUCOSE SERPL-MCNC: 95 MG/DL (ref 70–110)
HCT VFR BLD AUTO: 43.3 % (ref 40–54)
HGB BLD-MCNC: 13.6 G/DL (ref 14–18)
IMM GRANULOCYTES # BLD AUTO: 0.02 K/UL (ref 0–0.04)
IMM GRANULOCYTES NFR BLD AUTO: 0.2 % (ref 0–0.5)
LYMPHOCYTES # BLD AUTO: 2.4 K/UL (ref 1–4.8)
LYMPHOCYTES NFR BLD: 29.6 % (ref 18–48)
MAGNESIUM SERPL-MCNC: 1.9 MG/DL (ref 1.6–2.6)
MCH RBC QN AUTO: 29.6 PG (ref 27–31)
MCHC RBC AUTO-ENTMCNC: 31.4 G/DL (ref 32–36)
MCV RBC AUTO: 94 FL (ref 82–98)
MONOCYTES # BLD AUTO: 0.6 K/UL (ref 0.3–1)
MONOCYTES NFR BLD: 7.6 % (ref 4–15)
NEUTROPHILS # BLD AUTO: 5.1 K/UL (ref 1.8–7.7)
NEUTROPHILS NFR BLD: 61.7 % (ref 38–73)
NRBC BLD-RTO: 0 /100 WBC
PHOSPHATE SERPL-MCNC: 2.7 MG/DL (ref 2.7–4.5)
PLATELET # BLD AUTO: 234 K/UL (ref 150–350)
PMV BLD AUTO: 10.5 FL (ref 9.2–12.9)
POCT GLUCOSE: 88 MG/DL (ref 70–110)
POCT GLUCOSE: 96 MG/DL (ref 70–110)
POTASSIUM SERPL-SCNC: 3.8 MMOL/L (ref 3.5–5.1)
PROT SERPL-MCNC: 6 G/DL (ref 6–8.4)
RBC # BLD AUTO: 4.59 M/UL (ref 4.6–6.2)
SODIUM SERPL-SCNC: 139 MMOL/L (ref 136–145)
WBC # BLD AUTO: 8.18 K/UL (ref 3.9–12.7)

## 2020-09-27 PROCEDURE — 25000003 PHARM REV CODE 250: Performed by: UROLOGY

## 2020-09-27 PROCEDURE — 25000003 PHARM REV CODE 250: Performed by: STUDENT IN AN ORGANIZED HEALTH CARE EDUCATION/TRAINING PROGRAM

## 2020-09-27 PROCEDURE — 25000003 PHARM REV CODE 250: Performed by: NURSE PRACTITIONER

## 2020-09-27 PROCEDURE — 85025 COMPLETE CBC W/AUTO DIFF WBC: CPT

## 2020-09-27 PROCEDURE — 83735 ASSAY OF MAGNESIUM: CPT

## 2020-09-27 PROCEDURE — 63600175 PHARM REV CODE 636 W HCPCS: Performed by: UROLOGY

## 2020-09-27 PROCEDURE — 99232 PR SUBSEQUENT HOSPITAL CARE,LEVL II: ICD-10-PCS | Mod: ,,, | Performed by: NURSE PRACTITIONER

## 2020-09-27 PROCEDURE — 99232 SBSQ HOSP IP/OBS MODERATE 35: CPT | Mod: ,,, | Performed by: NURSE PRACTITIONER

## 2020-09-27 PROCEDURE — 97802 MEDICAL NUTRITION INDIV IN: CPT

## 2020-09-27 PROCEDURE — 92610 EVALUATE SWALLOWING FUNCTION: CPT

## 2020-09-27 PROCEDURE — 94761 N-INVAS EAR/PLS OXIMETRY MLT: CPT

## 2020-09-27 PROCEDURE — 11000001 HC ACUTE MED/SURG PRIVATE ROOM

## 2020-09-27 PROCEDURE — 84100 ASSAY OF PHOSPHORUS: CPT

## 2020-09-27 PROCEDURE — 97162 PT EVAL MOD COMPLEX 30 MIN: CPT

## 2020-09-27 PROCEDURE — 80053 COMPREHEN METABOLIC PANEL: CPT

## 2020-09-27 RX ORDER — CLOPIDOGREL BISULFATE 75 MG/1
75 TABLET ORAL DAILY
Status: DISCONTINUED | OUTPATIENT
Start: 2020-09-27 | End: 2020-09-28 | Stop reason: HOSPADM

## 2020-09-27 RX ORDER — NAPROXEN SODIUM 220 MG/1
81 TABLET, FILM COATED ORAL DAILY
Status: DISCONTINUED | OUTPATIENT
Start: 2020-09-28 | End: 2020-09-28 | Stop reason: HOSPADM

## 2020-09-27 RX ORDER — HEPARIN SODIUM 5000 [USP'U]/ML
5000 INJECTION, SOLUTION INTRAVENOUS; SUBCUTANEOUS EVERY 8 HOURS
Status: DISCONTINUED | OUTPATIENT
Start: 2020-09-27 | End: 2020-09-28 | Stop reason: HOSPADM

## 2020-09-27 RX ORDER — NAPROXEN SODIUM 220 MG/1
81 TABLET, FILM COATED ORAL ONCE
Status: COMPLETED | OUTPATIENT
Start: 2020-09-27 | End: 2020-09-27

## 2020-09-27 RX ADMIN — ATORVASTATIN CALCIUM 40 MG: 20 TABLET, FILM COATED ORAL at 08:09

## 2020-09-27 RX ADMIN — HEPARIN SODIUM 5000 UNITS: 5000 INJECTION INTRAVENOUS; SUBCUTANEOUS at 08:09

## 2020-09-27 RX ADMIN — ASPIRIN 81 MG: 81 TABLET, CHEWABLE ORAL at 06:09

## 2020-09-27 RX ADMIN — HEPARIN SODIUM 5000 UNITS: 5000 INJECTION INTRAVENOUS; SUBCUTANEOUS at 06:09

## 2020-09-27 RX ADMIN — SODIUM CHLORIDE: 0.9 INJECTION, SOLUTION INTRAVENOUS at 12:09

## 2020-09-27 RX ADMIN — HEPARIN SODIUM 5000 UNITS: 5000 INJECTION INTRAVENOUS; SUBCUTANEOUS at 02:09

## 2020-09-27 RX ADMIN — CLOPIDOGREL 75 MG: 75 TABLET, FILM COATED ORAL at 08:09

## 2020-09-27 NOTE — PT/OT/SLP EVAL
Physical Therapy Evaluation    Patient Name:  Dane Rosen   MRN:  [id number]  Admit Date: 9/26/2020  Admitting Diagnosis:  Embolic stroke involving right middle cerebral artery  Length of Stay: 1 days  Recent Surgery: * No surgery found *      Recommendations:     Discharge Recommendations:  home   Discharge Equipment Recommendations: none   Barriers to discharge: None    Assessment:     Dane Rosen is a 55 y.o. male admitted with a medical diagnosis of Embolic stroke involving right middle cerebral artery.  He presents with the following impairments/functional limitations: decreased functional mobility. Pt with intact strength and sensation of BUE and BLE, however noted continued left facial weakness and dysarthria. Dane Rosen's deficits effect their ability to safely and independently participate in self-care tasks and functional mobility which increases their caregiver burden. Due to his physical therapy diagnosis of debility and deconditioning, they continue to benefit from acute PT services to address the following limitations: high fall risk, weakness, instability, and the need for supervised instruction of exercise. These deficits effect their roles and responsibilities in which they were able to complete prior to admit. Pt would benefit from an intensive and collaborative PT/OT/SLP therapy program to improve quality of life and focus on recovery of impairments.     Problem List: impaired functional mobilty, impaired balance  Rehab Prognosis: Good; patient would benefit from acute skilled PT services to address these deficits and reach maximum level of function.      Plan:     During this hospitalization, patient to be seen 3 x/week to address the identified rehab impairments via gait training, therapeutic activities, therapeutic exercises, neuromuscular re-education and progress towards the established goals.    · Plan of Care Expires:  10/26/20    Subjective   Communicated with RN prior to session.   "Patient found supine upon PT entry to room, agreeable to evaluation. Dane Rosen's wife present during session.    Chief Complaint: Cerebrovascular Accident (Pt is a transfer from [facility] for evaluation of CVA. LKN 0715 this AM. presented with Aphasia, slurred speech and facial droop. TPA end at 10:18am)    Patient/Family Comments/goals: "My speech still sounds different."  Pain/Comfort:  · Pain Rating 1: 0/10  · Pain Rating Post-Intervention 1: 0/10    Living Environment:  Patient lives with wife in a single family home with no DEV.   Prior Level of Function:   Patient reports being independent with mobility & with ADLs. Hand Dominance: right. Patient uses DME as follows: none. DME owned (not currently used): none.  Roles/Repsonsibilities:   Work: Employed. Drive: yes. Managing Medicines/Managing Home: yes. Hobbies: fishing.    Patient reports they will have assistance from family upon discharge.    Objective:   Patient found with: blood pressure cuff, pulse ox (continuous), telemetry     General Precautions: Standard, Cardiac aspiration, fall   Orthopedic Precautions:N/A   Braces: N/A   Oxygen Device: Room Air  Vitals: BP (!) 144/76 (BP Location: Right arm, Patient Position: Lying)   Pulse 76   Temp 99.2 °F (37.3 °C) (Oral)   Resp 17   Ht 5' 8" (1.727 m)   Wt 93.9 kg (207 lb)   SpO2 96%   BMI 31.47 kg/m²     Exams:  · Cognition:   · Alert and Cooperative  · AxOx4  · Command following: Follows multistep  commands  · Fluency: clear/fluent  · Hearing: Intact  · Vision:  Intact visual fields     Left UE Left LE Right UE Right LE   Edema absent absent absent absent   ROM AROM WFL AROM WFL AROM WFL AROM WFL   Modified Kailee Scale 0: No increase in muscle tone 0: No increase in muscle tone 0: No increase in muscle tone 0: No increase in muscle tone   Strength 5/5 5/5 5/5 5/5   Sensation intact to light touch intact to light touch intact to light touch intact to light touch   Coordination normal normal " normal normal     Outcome Measures:  AM-PAC 6 CLICK MOBILITY  Turning over in bed (including adjusting bedclothes, sheets and blankets)?: 4  Sitting down on and standing up from a chair with arms (e.g., wheelchair, bedside commode, etc.): 4  Moving from lying on back to sitting on the side of the bed?: 4  Moving to and from a bed to a chair (including a wheelchair)?: 4  Need to walk in hospital room?: 4  Climbing 3-5 steps with a railing?: 4  Basic Mobility Total Score: 24     Postural Assessment Scale for Stroke Patients (PASS):  Maintaining a Posture  1. Sitting Without Support: PASS1: (3) Can sit for 5 minutes without support  2. Standing With Support:: (3) Can stand with support of only 1 hand  3. Standing Without Support: PASS3: (3) Can stand without support for more than 1 minutes and simultaneously perform arm movement at about shoulder level  4. Standing on Nonparetic Leg: PASS4: (3) Can stand on nonparetic leg for more than 10 seconds  5. Standing on Paretic Leg: PASS4: (2) Can stand on nonparetic leg for more than 5 seconds  Posture Subtotal: 14  Changing a Posture  6. Supine to Paretic Side Lateral: PASS6: (3) Can perform without help  7. Supine to Nonparetic Side Lateral: PASS6: (3) Can perform without help  8: Supine to Sitting Up on Edge of Mat: PASS6: (3) Can perform without help  9: Sitting on Edge of the Mat to Supine: PASS6: (3) Can perform without help  10. Sitting to Standing Up: PASS6: (3) Can perform without help  11: Standing Up to Sitting Down: PASS6: (3) Can perform without help  12. Standing, Picking up a Pencil from the floor: PASS6: (3) Can perform without help  Changing Posture Subtotal: 21  Total: 35/36    Functional Mobility:  Additional staff present: N/A  Bed Mobility:  · Supine to Sit: supervision; HOB flat and from right side of bed  · Scooting anteriorly to EOB to have both feet planted on floor: supervision    Transfers:   · Sit <> Stand Transfer: supervision with no assistive  device   · Stand <> Sit Transfer: supervision with no assistive device   · [id number] from EOB      Gait:   · Patient ambulated: ~25ft within room   · Patient required: supervision  · Patient used: no assistive device  · Gait Pattern observed: reciprocal gait  · Gait Deviation(s): no deviations noted    Therapeutic Activities & Exercises:   Education:  Patient provided with daily orientation and goals of this PT session. Patient and family agreed to participate in session. They were educated to transfer to bedside chair/bedside commode/bathroom with RN/PCT present. Patient and Family member educated on Fall risk, home safety, Home exercise program and plan of care by explanation.  Patient was receptive to education and verbalizes understanding. Encouraged patient to perform daily exercises & mobility to increase endurance and decrease effects of bedrest. Time provided for therapeutic counseling and discussion of current health disposition. All questions answered to patient's and family's satisfaction, within scope of PT practice; voiced no other concerns. White board updated in patient's room, RN notified of session.    Patient left up in chair (with chair alarm), with head in midline, neutral pelvis & heels floated for skin protection with all lines intact, call button in reach and RN notified.    GOALS:   Multidisciplinary Problems     Physical Therapy Goals        Problem: Physical Therapy Goal    Goal Priority Disciplines Outcome Goal Variances Interventions   Physical Therapy Goal     PT, PT/OT Ongoing, Progressing     Description: Goals to be met by: 10/4/2020    Patient will increase functional independence with mobility by performin. Supine <> sit with Modified [place].  2. Gait  x 150 feet with Modified [place] using No Assistive Device to prepare for community ambulation and endurance activities.  3. Dynamic standing for 8 minutes with Supervision using No Assistive Device to prepare for  functional tasks in standing.  4. Able to tolerate exercise for 15-20 reps with independence.                         History:     Past Medical History:   Diagnosis Date    Cancer     skin       Past Surgical History:   Procedure Laterality Date    SKIN CANCER EXCISION         Time Tracking:     PT Received On: 09/27/20  PT Start Time: 1223     PT Stop Time: 1233  PT Total Time (min): 10 min     Billable Minutes: Evaluation 10    Adrianna Marti PT, DPT  9/27/2020  Pager: [phone]

## 2020-09-27 NOTE — PLAN OF CARE
[facility] Care Plan    POC reviewed with Dane Rosen and family at 1400. Pt verbalized understanding. Questions and concerns addressed. No acute events today. Pt ambulated in room and sat in chair multiple times.  Pt progressing toward goals. Will continue to monitor. See below and flowsheets for full assessment and VS info.       Neuro:  Thuan Coma Scale  Best Eye Response: 4-->(E4) spontaneous  Best Motor Response: 6-->(M6) obeys commands  Best Verbal Response: 5-->(V5) oriented  Thuan Coma Scale Score: 15  Assessment Qualifiers: patient not sedated/intubated        24 hr Temp:  [98.1 °F (36.7 °C)-99.5 °F (37.5 °C)]     CV:   Rhythm: normal sinus rhythm  BP goals:   SBP < 180  MAP > 65    Resp:   O2 Device (Oxygen Therapy): room air       Plan: N/A    GI/:  IZZY Total Score: 20  Diet/Nutrition Received: regular  Last Bowel Movement: 09/27/20       Intake/Output Summary (Last 24 hours) at 9/27/2020 1717  Last data filed at 9/27/2020 1402  Gross per 24 hour   Intake 2313.33 ml   Output 2350 ml   Net -36.67 ml     Unmeasured Output  Urine Occurrence: 1  Stool Occurrence: 1    Labs/Accuchecks:  Recent Labs   Lab 09/27/20  0113   WBC 8.18   RBC 4.59*   HGB 13.6*   HCT 43.3         Recent Labs   Lab 09/27/20  0113      K 3.8   CO2 20*   *   BUN 12   CREATININE 0.9   ALKPHOS 52*   ALT 19   AST 15   BILITOT 0.5      Recent Labs   Lab 09/26/20  1240   INR 1.1    No results for input(s): CPK, CPKMB, TROPONINI, MB in the last 168 hours.    Electrolytes: N/A - electrolytes WDL  Accuchecks: none    Gtts:       LDA/Wounds:  Lines/Drains/Airways       Peripheral Intravenous Line                   Peripheral IV - Single Lumen 09/26/20 1216 18 G Right Hand 1 day         Peripheral IV - Single Lumen 09/26/20 1217 20 G Left Antecubital 1 day                  Wounds: No  Wound care consulted: No

## 2020-09-27 NOTE — HOSPITAL COURSE
MRI confirmed acute infarctions within the right posterior frontal lobe, the insular cortex, and the right basal ganglia. Patient doing well clinically- left facial droop and mild dysarthria. tPA monitoring time has ended without incident. Patient stepped down to stroke primary service on 9/27. TTE with bubble study does not show any PFO. PT/OT recommending home. Patient will get discharged today on DAPT (Plavix only for 30 days) as well as atorvastatin. Outpatient PT and outpatient speech referrals printed and handed to patient.

## 2020-09-27 NOTE — PLAN OF CARE
[facility] Care Plan    POC reviewed with Dane Rosen at 0300. Pt verbalized understanding. Questions and concerns addressed. No acute events overnight. MRI this AM. Pt progressing toward goals. Will continue to monitor. See below and flowsheets for full assessment and VS info.       Neuro:  Thuan Coma Scale  Best Eye Response: 4-->(E4) spontaneous  Best Motor Response: 6-->(M6) obeys commands  Best Verbal Response: 5-->(V5) oriented  Thuan Coma Scale Score: 15        24hr Temp:  [98.1 °F (36.7 °C)-99.1 °F (37.3 °C)]     CV:   Rhythm: normal sinus rhythm  BP goals:   SBP < 180  MAP > 65    Resp:   O2 Device (Oxygen Therapy): room air       Plan:  follow up MRI    GI/:  IZZY Total Score: 20  Diet/Nutrition Received: regular  Last Bowel Movement: 09/27/20       Intake/Output Summary (Last 24 hours) at 9/27/2020 0439  Last data filed at 9/27/2020 0405  Gross per 24 hour   Intake 1343.33 ml   Output 1050 ml   Net 293.33 ml     Unmeasured Output  Urine Occurrence: 1    Labs/Accuchecks:  Recent Labs   Lab 09/27/20  0113   WBC 8.18   RBC 4.59*   HGB 13.6*   HCT 43.3         Recent Labs   Lab 09/27/20  0113      K 3.8   CO2 20*   *   BUN 12   CREATININE 0.9   ALKPHOS 52*   ALT 19   AST 15   BILITOT 0.5      Recent Labs   Lab 09/26/20  1240   INR 1.1    No results for input(s): CPK, CPKMB, TROPONINI, MB in the last 168 hours.    Electrolytes: N/A - electrolytes WDL  Accuchecks: Q6H    Gtts:   sodium chloride 0.9% 100 mL/hr at 09/27/20 0405       LDA/Wounds:  Lines/Drains/Airways       Peripheral Intravenous Line                   Peripheral IV - Single Lumen 09/26/20 1216 18 G Right Hand less than 1 day         Peripheral IV - Single Lumen 09/26/20 1217 20 G Left Antecubital less than 1 day                  Wounds: No  Wound care consulted: No

## 2020-09-27 NOTE — NURSING TRANSFER
Nursing Transfer Note      9/27/2020     Transfer 958 from [id number]    Transfer via wheelchair    Transfer with cardiac monitoring    Transported by RN    Medicines sent: none    Chart send with patient: Yes    Notified: spouse at bedside    Patient reassessed at: 09/27/20 1700    Upon arrival to floor: cardiac monitor applied, patient oriented to room, call bell in reach and bed in lowest position

## 2020-09-27 NOTE — CARE UPDATE
Pt traveled to MRI with RN x2 on portable monitor with ambu bag available. VSS. Returned back to room, placed on bedside monitoring, call bell within reach, and bed alarm on. ALFREDO.

## 2020-09-27 NOTE — CARE UPDATE
Patient arrived to [facility] from [facility] by [facility]    Type of stroke/diagnosis: Embolic R MCA s/p tPA    TPA start time: 0922  TPA end time: 1018    Thrombectomy start and end time: N/A    Current symptoms: mild left facial droop; dysarthria    Skin assessment done: Yes  Wounds noted: none  IZZY Armband Applied: yes  Patient Belongings on Admit: 2 white and grey socks     NCC notified: Jesus Alberto Cain MD

## 2020-09-27 NOTE — PLAN OF CARE
Problem: SLP Goal  Goal: SLP Goal  Description: Speech Language Pathology Goals  Goals expected to be met by 10/5:  1. Pt will tolerate regular consistency diet and thin liquids without s/s of aspiration.   2. Pt will perform OME's x 10 to increase oral motor strength and ROM.   3. Pt will participate in speech/language/cognitive evaluation to determine need for further intervention.       Outcome: Ongoing, Progressing  Bedside swallow study completed.  Pt safe to remain on regular consistency diet and thin liquids, but should watch for pocketing.  OME's to be performed. SLC eval next session.  TONYA Padgett, CCC-SLP  Speech Language Pathologist  [phone]  9/27/2020

## 2020-09-27 NOTE — SUBJECTIVE & OBJECTIVE
Review of Systems  Review of Symptoms:  Constitutional: Denies fevers, weight loss, chills, or weakness.  Eyes: Denies changes in vision.  ENT: Denies dysphagia, nasal discharge, ear pain or discharge.  Cardiovascular: Denies chest pain, palpitations, orthopnea, or claudication.  Respiratory: Denies shortness of breath, cough, hemoptysis, or wheezing.  GI: Denies nausea/vomitting, hematochezia, melena, abd pain, or changes in appetite.  : Denies dysuria, incontinence, or hematuria.  Musculoskeletal: Denies joint pain or myalgias.  Skin/breast: Denies rashes, lumps, lesions, or discharge.  Neurologic: Denies headache, dizziness, vertigo, or paresthesias.  Psychiatric: Denies changes in mood or hallucinations.  Endocrine: Denies polyuria, polydipsia, heat/cold intolerance.  Hematologic/Lymph: Denies lymphadenopathy, easy bruising or easy bleeding.  Allergic/Immunologic: Denies rash, rhinitis.   Objective:     Vitals:  Temp: 99 °F (37.2 °C)  Pulse: 79  Rhythm: normal sinus rhythm  BP: (!) 157/77  MAP (mmHg): 110  Resp: 20  SpO2: 95 %  O2 Device (Oxygen Therapy): room air    Temp  Min: 98.1 °F (36.7 °C)  Max: 99.1 °F (37.3 °C)  Pulse  Min: 59  Max: 99  BP  Min: 106/55  Max: 160/74  MAP (mmHg)  Min: 75  Max: 110  Resp  Min: 14  Max: 22  SpO2  Min: 94 %  Max: 99 %    09/26 0701 - 09/27 0700  In: 1543.3 [I.V.:1543.3]  Out: 1550 [Urine:1550]   Unmeasured Output  Urine Occurrence: 1  Stool Occurrence: 0       Physical Exam  GA: Alert, comfortable, no acute distress.   HEENT: No scleral icterus or JVD.   Pulmonary: Clear to auscultation A/L. No wheezing, crackles, or rhonchi.  Cardiac: RRR S1 & S2 w/o rubs/murmurs/gallops.   Abdominal: Bowel sounds present x 4. No appreciable hepatosplenomegaly.  Skin: No jaundice, rashes, or visible lesions.  Neuro:  --GCS: E4 V5 M6  --Mental Status:  Oriented X4, follows commands, mild difficulty finding words  --CN II-XII grossly intact.   --Pupils 3mm, PERRL.   --Corneal reflex,  gag, cough intact.  --SAAB spont    Medications:  Continuous Scheduled[START ON 9/28/2020] aspirin, 81 mg, Daily  atorvastatin, 40 mg, Daily  heparin (porcine), 5,000 Units, Q8H  senna-docusate 8.6-50 mg, 1 tablet, BID    PRNmagnesium oxide, 800 mg, PRN  ondansetron, 4 mg, Q8H PRN  potassium chloride, 60 mEq, PRN  potassium, sodium phosphates, 2 packet, PRN  sodium chloride 0.9%, 10 mL, PRN      Today I personally reviewed pertinent medications, lines/drains/airways, imaging, cardiology results, laboratory results,     Diet  Diet Adult Regular (IDDSI Level 7)

## 2020-09-27 NOTE — PROGRESS NOTES
[facility]  Neurocritical Care  Progress Note    Admit Date: 9/26/2020  Service Date: 09/27/2020  Length of Stay: 1    Subjective:     Chief Complaint: Embolic stroke involving right middle cerebral artery    History of Present Illness: Dane Rosen is a 55 year old male who was at work this morning [id number] when he had some dysphagia while drinking. When he stood up he noticed some left sided weakness and was slurring his words. LKN 0715 He presented to [facility] and was noted to have slurred speech,, facial droop, and ataxia. CT head w/o was negative for acute bleeding but concerning for right MCA stroke. Teleconsult was performed and tPA was adminstered. Patient was transferred to [facility] for higher level of care. Repeat CT revealed a subtle hypoattenuation of the right lateral frontal lobe and operculum. In the ED post tPA the patient is at his baseline. He is alert and oriented to time, person, place, and situation. He is no longer slurring his words. Strength 5/5 in all extremeties. Minor left sided facial droop resolved by the time the patient was staffed. He will be admitted to the [facility] for close monitoring s/p tPA.     Hospital Course: 9/26- Transfer from [facility] for R MCA stroke with aphasia, dysphagia, ataxia, and facial droop. tPA end time 1018. Post tPA at baseline with no residual deficits.   9/27: MRI brain completed, on asa and sq heparin, d/c IVF,stepdown to stroke today        Review of Systems  Review of Symptoms:  Constitutional: Denies fevers, weight loss, chills, or weakness.  Eyes: Denies changes in vision.  ENT: Denies dysphagia, nasal discharge, ear pain or discharge.  Cardiovascular: Denies chest pain, palpitations, orthopnea, or claudication.  Respiratory: Denies shortness of breath, cough, hemoptysis, or wheezing.  GI: Denies nausea/vomitting, hematochezia, melena, abd pain, or changes in appetite.  : Denies dysuria, incontinence, or  hematuria.  Musculoskeletal: Denies joint pain or myalgias.  Skin/breast: Denies rashes, lumps, lesions, or discharge.  Neurologic: Denies headache, dizziness, vertigo, or paresthesias.  Psychiatric: Denies changes in mood or hallucinations.  Endocrine: Denies polyuria, polydipsia, heat/cold intolerance.  Hematologic/Lymph: Denies lymphadenopathy, easy bruising or easy bleeding.  Allergic/Immunologic: Denies rash, rhinitis.   Objective:     Vitals:  Temp: 99 °F (37.2 °C)  Pulse: 79  Rhythm: normal sinus rhythm  BP: (!) 157/77  MAP (mmHg): 110  Resp: 20  SpO2: 95 %  O2 Device (Oxygen Therapy): room air    Temp  Min: 98.1 °F (36.7 °C)  Max: 99.1 °F (37.3 °C)  Pulse  Min: 59  Max: 99  BP  Min: 106/55  Max: 160/74  MAP (mmHg)  Min: 75  Max: 110  Resp  Min: 14  Max: 22  SpO2  Min: 94 %  Max: 99 %    09/26 0701 - 09/27 0700  In: 1543.3 [I.V.:1543.3]  Out: 1550 [Urine:1550]   Unmeasured Output  Urine Occurrence: 1  Stool Occurrence: 0       Physical Exam  GA: Alert, comfortable, no acute distress.   HEENT: No scleral icterus or JVD.   Pulmonary: Clear to auscultation A/L. No wheezing, crackles, or rhonchi.  Cardiac: RRR S1 & S2 w/o rubs/murmurs/gallops.   Abdominal: Bowel sounds present x 4. No appreciable hepatosplenomegaly.  Skin: No jaundice, rashes, or visible lesions.  Neuro:  --GCS: E4 V5 M6  --Mental Status:  Oriented X4, follows commands, mild difficulty finding words  --CN II-XII grossly intact.   --Pupils 3mm, PERRL.   --Corneal reflex, gag, cough intact.  --SAAB spont    Medications:  Continuous Scheduled[START ON 9/28/2020] aspirin, 81 mg, Daily  atorvastatin, 40 mg, Daily  heparin (porcine), 5,000 Units, Q8H  senna-docusate 8.6-50 mg, 1 tablet, BID    PRNmagnesium oxide, 800 mg, PRN  ondansetron, 4 mg, Q8H PRN  potassium chloride, 60 mEq, PRN  potassium, sodium phosphates, 2 packet, PRN  sodium chloride 0.9%, 10 mL, PRN      Today I personally reviewed pertinent medications, lines/drains/airways, imaging,  cardiology results, laboratory results,     Diet  Diet Adult Regular (IDDSI Level 7)      Assessment/Plan:     Neuro  Cerebrovascular accident (CVA) due to thrombosis of right middle cerebral artery  Admit to NCC  -Neuro checks q1hr, vital signs q1hr  -SBP goal < 180  -Hold AC/AP at this time  -Vascular neurology following  -Atorvastatin  -Lipid panel, TSH, HgbA1c  -Mechanical SCDs  -PT/OT/SLP  -1 liter NS bolus + 100mL/hr NS  -9/27: MRI Brain revealed: Acute infarctions within the right posterior frontal lobe, the insular cortex, and the right basal ganglia.  2. Associated focus of susceptibility compatible with thrombosis of a right M2 segment  D/c IVF  -on ASA/and sq heparin  - pending TTE  - likely to stepdown to stroke today       Cardiac/Vascular  Hyperlipemia  Statin          The patient is being Prophylaxed for:  Venous Thromboembolism with: Chemical  Stress Ulcer with: None  Ventilator Pneumonia with: not applicable    Activity Orders          Diet Adult Regular (IDDSI Level 7): Regular starting at 09/26 1618        Full Code    Roxie Pineda NP  Neurocritical Care  [facility]

## 2020-09-27 NOTE — SUBJECTIVE & OBJECTIVE
Neurologic Chief Complaint: left side weakness, dysarthria, and left facial droop    Subjective:     Interval History: Patient is seen for follow-up neurological assessment and treatment recommendations:   MRI confirmed acute infarctions within the right posterior frontal lobe, the insular cortex, and the right basal ganglia. Patient doing well clinically- left facial droop and mild dysarthria. tPA monitoring time has ended without incident. Patient stepped down to stroke primary service. TTE with bubble not avialble on weekend- rescheduled for Monday. PT recommending home. OT recs pending. Patient may be able to discharge after TTE with bubble.     HPI, Past Medical, Family, and Social History remains the same as documented in the initial encounter.     Review of Systems   HENT: Negative for trouble swallowing.    Respiratory: Negative for shortness of breath and wheezing.    Cardiovascular: Negative for chest pain and palpitations.   Musculoskeletal: Negative for gait problem.   Neurological: Positive for facial asymmetry and speech difficulty. Negative for weakness.   Psychiatric/Behavioral: Negative for confusion and decreased concentration.     Scheduled Meds:   [START ON 9/28/2020] aspirin  81 mg Oral Daily    atorvastatin  40 mg Oral Daily    heparin (porcine)  5,000 Units Subcutaneous Q8H    senna-docusate 8.6-50 mg  1 tablet Oral BID     Continuous Infusions:  PRN Meds:magnesium oxide, ondansetron, potassium chloride, potassium, sodium phosphates, sodium chloride 0.9%    Objective:     Vital Signs (Most Recent):  Temp: 99.5 °F (37.5 °C) (09/27/20 1502)  Pulse: 76 (09/27/20 1602)  Resp: 12 (09/27/20 1602)  BP: (!) 146/80 (09/27/20 1602)  SpO2: (!) 94 % (09/27/20 1602)  BP Location: Right arm    Vital Signs Range (Last 24H):  Temp:  [98.1 °F (36.7 °C)-99.5 °F (37.5 °C)]   Pulse:  [59-97]   Resp:  [10-22]   BP: (106-157)/(55-87)   SpO2:  [94 %-98 %]   BP Location: Right arm    Physical Exam  HENT:       Right Ear: External ear normal.      Left Ear: External ear normal.   Cardiovascular:      Rate and Rhythm: Normal rate.   Pulmonary:      Effort: Pulmonary effort is normal.   Musculoskeletal: Normal range of motion.   Neurological:      Mental Status: He is alert.      Cranial Nerves: Dysarthria and facial asymmetry present.         Neurological Exam:   LOC: alert  Attention Span: Good   Language: No aphasia  Articulation: Dysarthria  Orientation: Person, Place, Time   Visual Fields: Full  EOM (CN III, IV, VI): Full/intact  Pupils (CN II, III): PERRL  Facial Sensation (CN V): Normal  Facial Movement (CN VII): Lower facial weakness on the Left  Motor: Arm left  Normal 5/5  Leg left  Normal 5/5  Arm right  Normal 5/5  Leg right Normal 5/5  Cebellar: No evidence of appendicular or axial ataxia  Sensation: Intact to light touch, temperature and vibration  Tone: Normal tone throughout    Laboratory:  CMP:   Recent Labs   Lab 09/27/20  0113   CALCIUM 7.7*   ALBUMIN 3.1*   PROT 6.0      K 3.8   CO2 20*   *   BUN 12   CREATININE 0.9   ALKPHOS 52*   ALT 19   AST 15   BILITOT 0.5     BMP:   Recent Labs   Lab 09/27/20  0113      K 3.8   *   CO2 20*   BUN 12   CREATININE 0.9   CALCIUM 7.7*     CBC:   Recent Labs   Lab 09/27/20  0113   WBC 8.18   RBC 4.59*   HGB 13.6*   HCT 43.3      MCV 94   MCH 29.6   MCHC 31.4*     Lipid Panel:   Recent Labs   Lab 09/26/20  1240   CHOL 239*   LDLCALC 166.0*   HDL 48   TRIG 125     Coagulation:   Recent Labs   Lab 09/26/20  1240   INR 1.1     Platelet Aggregation Study: No results for input(s): PLTAGG, PLTAGINTERP, PLTAGREGLACO, ADPPLTAGGREG in the last 168 hours.  Hgb A1C:   Recent Labs   Lab 09/26/20  1407   HGBA1C 5.3     TSH:   Recent Labs   Lab 09/26/20  1240   TSH 1.702       Diagnostic Results     Brain Imaging      MRI brain w/o 9/27/20    1. Acute infarctions within the right posterior frontal lobe, the insular cortex, and the right basal ganglia.  2.  Associated focus of susceptibility compatible with thrombosis of a right M2 segment.    CT head w/o contrast 9/26/20   Subtle hypoattenuation in the right lateral frontal lobe and operculum suspicious for early ischemic changes.  Recommend MRI brain for further evaluation.         Vessel Imaging   CTA completed at OSH with RM1    Cardiac Imaging     TTE with bubble pending

## 2020-09-27 NOTE — PLAN OF CARE
Recommendations    Recommendation:   1. Continue regular diet, encourage good PO at meals   2. Add boost plus if PO <50% of meals  RD to monitor and follow up    Goals: pt to tolerate >85% of EEN/EPN by RD follow up  Nutrition Goal Status: new  Communication of RD Recs: other (comment)(POC)

## 2020-09-27 NOTE — CONSULTS
"  [facility]  Adult Nutrition  Consult Note    SUMMARY     Recommendations    Recommendation:   1. Continue regular diet, encourage good PO at meals   2. Add boost plus if PO <50% of meals  RD to monitor and follow up    Goals: pt to tolerate >85% of EEN/EPN by RD follow up  Nutrition Goal Status: new  Communication of RD Recs: other (comment)(POC)    Reason for Assessment    Reason For Assessment: consult  Diagnosis: (stroke)  Relevant Medical History: cancer  Interdisciplinary Rounds: did not attend  General Information Comments: Pt and family in room. Reported good PO intake, PO % of meals. PTA states good PO intake at home. No recent wt loss reported. UBW ~205 lbs per pt. NFPE not warranted at this time, pt with no s/s of malnutrition.  Nutrition Discharge Planning: adequate PO intake    Nutrition Risk Screen    Nutrition Risk Screen: no indicators present    Nutrition/Diet History    Food Allergies: NKFA  Factors Affecting Nutritional Intake: None identified at this time    Anthropometrics    Temp: 99.2 °F (37.3 °C)  Height Method: Stated  Height: 5' 8" (172.7 cm)  Height (inches): 68 in  Weight Method: Bed Scale  Weight: 93.9 kg (207 lb)  Weight (lb): 207 lb  Ideal Body Weight (IBW), Male: 154 lb  % Ideal Body Weight, Male (lb): 134.42 %  BMI (Calculated): 31.5  BMI Grade: 30 - 34.9- obesity - grade I    Lab/Procedures/Meds    Pertinent Labs Reviewed: reviewed  Pertinent Labs Comments: Ca 7.7  Pertinent Medications Reviewed: reviewed  Pertinent Medications Comments: aspirin; statin; senna-docusate     Estimated/Assessed Needs    Weight Used For Calorie Calculations: 93.9 kg (207 lb 0.2 oz)  Energy Calorie Requirements (kcal): 2091 kcal/d  Energy Need Method: [place]-St Jeor(x1.2)  Protein Requirements:  g/day  Weight Used For Protein Calculations: 93.9 kg (207 lb 0.2 oz)  Fluid Requirements (mL): 1 mL/kcal or per MD  RDA Method (mL): 2091    Nutrition Prescription " Ordered    Current Diet Order: regular diet    Evaluation of Received Nutrient/Fluid Intake    I/O: -.6 L since admit  Energy Calories Required: meeting needs  Protein Required: meeting needs  Fluid Required: meeting needs  Comments: LBM 9/27  Tolerance: tolerating  % Intake of Estimated Energy Needs: 75 - 100 %  % Meal Intake: 75 - 100 %    Nutrition Risk    Level of Risk/Frequency of Follow-up: low     Assessment and Plan  Nutrition Problem  obesity    Related to (etiology):   Excessive oral intake     Signs and Symptoms (as evidenced by):   BMI of 31     Interventions (treatment strategy):  Collaboration of care with other providers    Nutrition Diagnosis Status:   New    Monitor and Evaluation    Food and Nutrient Intake: energy intake, food and beverage intake  Food and Nutrient Adminstration: diet order  Anthropometric Measurements: weight, weight change  Biochemical Data, Medical Tests and Procedures: gastrointestinal profile, electrolyte and renal panel, glucose/endocrine profile, inflammatory profile, lipid profile  Nutrition-Focused Physical Findings: overall appearance     Nutrition Follow-Up    RD Follow-up?: Yes

## 2020-09-27 NOTE — PLAN OF CARE
Plan of Care:  Discharge Recommendation: Home, no PT needs.  Please continue Progressive Mobility Protocol as appropriate.  Appropriate transfer level with nursing staff: Safe to transfer to bedside chair/bedside commode: stand pivot with 1 person.    Adrianna Marti PT, DPT  2020  Pager: [phone]    Physical Therapy Treatment Goals:  Multidisciplinary Problems       Physical Therapy Goals          Problem: Physical Therapy Goal    Goal Priority Disciplines Outcome Goal Variances Interventions   Physical Therapy Goal     PT, PT/OT Ongoing, Progressing     Description: Goals to be met by: 10/4/2020    Patient will increase functional independence with mobility by performin. Supine <> sit with Modified [place].  2. Gait  x 150 feet with Modified [place] using No Assistive Device to prepare for community ambulation and endurance activities.  3. Dynamic standing for 8 minutes with Supervision using No Assistive Device to prepare for functional tasks in standing.  4. Able to tolerate exercise for 15-20 reps with independence.

## 2020-09-27 NOTE — PT/OT/SLP EVAL
Speech Language Pathology Evaluation  Bedside Swallow    Patient Name:  Dane Rosen   MRN:  [id number]  Admitting Diagnosis: Embolic stroke involving right middle cerebral artery    Recommendations:                 General Recommendations:  Speech language evaluation, Cognitive-linguistic evaluation and monitor diet tolerance  Diet recommendations:  Regular, Thin   Aspiration Precautions: 1 bite/sip at a time, Alternating bites/sips, Avoid talking while eating, Check for pocketing/oral residue, HOB to 90 degrees, Monitor for s/s of aspiration, Small bites/sips and Standard aspiration precautions   General Precautions: Standard, aspiration, fall  Communication strategies:  none    History:     Past Medical History:   Diagnosis Date    Cancer     skin       Past Surgical History:   Procedure Laterality Date    SKIN CANCER EXCISION       History of Present Illness: Dane Rosen is a 55 year old male who was at work this morning [id number] when he had some dysphagia while drinking. When he stood up he noticed some left sided weakness and was slurring his words. N 0715 He presented to [facility] and was noted to have slurred speech,, facial droop, and ataxia. CT head w/o was negative for acute bleeding but concerning for right MCA stroke. Teleconsult was performed and tPA was adminstered. Patient was transferred to [facility] for higher level of care. Repeat CT revealed a subtle hypoattenuation of the right lateral frontal lobe and operculum. In the ED post tPA the patient is at his baseline. He is alert and oriented to time, person, place, and situation. He is no longer slurring his words. Strength 5/5 in all extremeties. Minor left sided facial droop resolved by the time the patient was staffed. He will be admitted to the [facility] for close monitoring s/p tPA.      Prior Intubation HX:  not during this admission    Modified Barium Swallow: none on file    Prior diet: regular solids/thin liquids; passed IZZY with  "20/20    Subjective     "That's what I notice the most is my speech."    Pain/Comfort:  · Pain Rating 1: 0/10    Objective:     Oral Musculature Evaluation  · Oral Musculature: left weakness  · Dentition: present and adequate  · Secretion Management: adequate  · Mucosal Quality: good  · Mandibular Strength and Mobility: WFL  · Oral Labial Strength and Mobility: impaired retraction  · Lingual Strength and Mobility: impaired anterior elevation, impaired left lateral movement  · Buccal Strength and Mobility: decreased tone(mild on left side)  · Volitional Cough: elicited  · Volitional Swallow: strong  · Voice Prior to PO Intake: dry, clear    Bedside Swallow Eval:   Consistencies Assessed:  · Thin liquids straw sips x 2  · Solids 1/2 cracker x 1     Oral Phase:   · WFL    Pharyngeal Phase:   · no overt clinical signs/symptoms of aspiration  · no overt clinical signs/symptoms of pharyngeal dysphagia    Compensatory Strategies  · None    Treatment: Education provided to pt regarding role of SLP, purpose of swallow evaluation, diet recommendations, potential for pocketing 2/2 left oral motor weakness, strategies to manage pocketing, oral motor exercises, and plan for speech/language/cognitive evaluation next session.  Pt/wife demonstrated understanding.     Assessment:     Dane Rosen is a 55 y.o. male.  Pt presents with left oral motor weakness, but appears safe to continue on a regular consistency diet with thin liquids.  Speech/language/cognitive evaluation to be conducted next session.      Goals:   Multidisciplinary Problems     SLP Goals        Problem: SLP Goal    Goal Priority Disciplines Outcome   SLP Goal     SLP Ongoing, Progressing   Description: Speech Language Pathology Goals  Goals expected to be met by 10/5:  1. Pt will tolerate regular consistency diet and thin liquids without s/s of aspiration.   2. Pt will perform OME's x 10 to increase oral motor strength and ROM.   3. Pt will participate in " speech/language/cognitive evaluation to determine need for further intervention.                            Plan:     · Patient to be seen:  4 x/week   · Plan of Care expires:  10/27/20  · Plan of Care reviewed with:  patient, spouse   · SLP Follow-Up:  Yes       Discharge recommendations:  (tbd)     Time Tracking:     SLP Treatment Date:   09/27/20  Speech Start Time:  0948  Speech Stop Time:  0957     Speech Total Time (min):  9 min    Billable Minutes: Eval Swallow and Oral Function 9    TONYA Padgett, CCC-SLP  09/27/2020     TONYA Padgett, CCC-SLP  Speech Language Pathologist  [phone]  9/27/2020

## 2020-09-27 NOTE — ASSESSMENT & PLAN NOTE
Admit to [facility]  -Neuro checks q1hr, vital signs q1hr  -SBP goal < 180  -Hold AC/AP at this time  -Vascular neurology following  -Atorvastatin  -Lipid panel, TSH, HgbA1c  -Mechanical SCDs  -PT/OT/SLP  -1 liter NS bolus + 100mL/hr NS  -9/27: MRI Brain revealed: Acute infarctions within the right posterior frontal lobe, the insular cortex, and the right basal ganglia.  2. Associated focus of susceptibility compatible with thrombosis of a right M2 segment  D/c IVF  -on ASA/and sq heparin  - pending TTE  - likely to stepdown to stroke today

## 2020-09-28 VITALS
RESPIRATION RATE: 17 BRPM | HEIGHT: 68 IN | BODY MASS INDEX: 31.37 KG/M2 | SYSTOLIC BLOOD PRESSURE: 140 MMHG | DIASTOLIC BLOOD PRESSURE: 69 MMHG | WEIGHT: 207 LBS | TEMPERATURE: 98 F | OXYGEN SATURATION: 96 % | HEART RATE: 70 BPM

## 2020-09-28 PROBLEM — I63.311 CEREBROVASCULAR ACCIDENT (CVA) DUE TO THROMBOSIS OF RIGHT MIDDLE CEREBRAL ARTERY: Status: RESOLVED | Noted: 2020-09-26 | Resolved: 2020-09-28

## 2020-09-28 LAB
ALBUMIN SERPL BCP-MCNC: 3.5 G/DL (ref 3.5–5.2)
ALP SERPL-CCNC: 62 U/L (ref 55–135)
ALT SERPL W/O P-5'-P-CCNC: 18 U/L (ref 10–44)
ANION GAP SERPL CALC-SCNC: 6 MMOL/L (ref 8–16)
AST SERPL-CCNC: 16 U/L (ref 10–40)
BASOPHILS # BLD AUTO: 0.03 K/UL (ref 0–0.2)
BASOPHILS NFR BLD: 0.4 % (ref 0–1.9)
BILIRUB SERPL-MCNC: 0.5 MG/DL (ref 0.1–1)
BSA FOR ECHO PROCEDURE: 2.12 M2
BUN SERPL-MCNC: 15 MG/DL (ref 6–20)
CALCIUM SERPL-MCNC: 8.7 MG/DL (ref 8.7–10.5)
CHLORIDE SERPL-SCNC: 105 MMOL/L (ref 95–110)
CO2 SERPL-SCNC: 27 MMOL/L (ref 23–29)
CREAT SERPL-MCNC: 1.2 MG/DL (ref 0.5–1.4)
DIFFERENTIAL METHOD: NORMAL
EOSINOPHIL # BLD AUTO: 0.1 K/UL (ref 0–0.5)
EOSINOPHIL NFR BLD: 1.5 % (ref 0–8)
ERYTHROCYTE [DISTWIDTH] IN BLOOD BY AUTOMATED COUNT: 13.1 % (ref 11.5–14.5)
EST. GFR  (AFRICAN AMERICAN): >60 ML/MIN/1.73 M^2
EST. GFR  (NON AFRICAN AMERICAN): >60 ML/MIN/1.73 M^2
GLUCOSE SERPL-MCNC: 103 MG/DL (ref 70–110)
HCT VFR BLD AUTO: 47 % (ref 40–54)
HGB BLD-MCNC: 15.2 G/DL (ref 14–18)
IMM GRANULOCYTES # BLD AUTO: 0.02 K/UL (ref 0–0.04)
IMM GRANULOCYTES NFR BLD AUTO: 0.3 % (ref 0–0.5)
LYMPHOCYTES # BLD AUTO: 2.2 K/UL (ref 1–4.8)
LYMPHOCYTES NFR BLD: 30.8 % (ref 18–48)
MAGNESIUM SERPL-MCNC: 1.9 MG/DL (ref 1.6–2.6)
MCH RBC QN AUTO: 29.6 PG (ref 27–31)
MCHC RBC AUTO-ENTMCNC: 32.3 G/DL (ref 32–36)
MCV RBC AUTO: 92 FL (ref 82–98)
MONOCYTES # BLD AUTO: 0.5 K/UL (ref 0.3–1)
MONOCYTES NFR BLD: 6.4 % (ref 4–15)
NEUTROPHILS # BLD AUTO: 4.3 K/UL (ref 1.8–7.7)
NEUTROPHILS NFR BLD: 60.6 % (ref 38–73)
NRBC BLD-RTO: 0 /100 WBC
PHOSPHATE SERPL-MCNC: 2.6 MG/DL (ref 2.7–4.5)
PLATELET # BLD AUTO: 276 K/UL (ref 150–350)
PMV BLD AUTO: 11 FL (ref 9.2–12.9)
POTASSIUM SERPL-SCNC: 3.9 MMOL/L (ref 3.5–5.1)
PROT SERPL-MCNC: 6.8 G/DL (ref 6–8.4)
RBC # BLD AUTO: 5.13 M/UL (ref 4.6–6.2)
SODIUM SERPL-SCNC: 138 MMOL/L (ref 136–145)
WBC # BLD AUTO: 7.14 K/UL (ref 3.9–12.7)

## 2020-09-28 PROCEDURE — 83735 ASSAY OF MAGNESIUM: CPT

## 2020-09-28 PROCEDURE — 85025 COMPLETE CBC W/AUTO DIFF WBC: CPT

## 2020-09-28 PROCEDURE — 80053 COMPREHEN METABOLIC PANEL: CPT

## 2020-09-28 PROCEDURE — 97165 OT EVAL LOW COMPLEX 30 MIN: CPT

## 2020-09-28 PROCEDURE — 99233 PR SUBSEQUENT HOSPITAL CARE,LEVL III: ICD-10-PCS | Mod: ,,, | Performed by: PSYCHIATRY & NEUROLOGY

## 2020-09-28 PROCEDURE — 25000003 PHARM REV CODE 250: Performed by: NURSE PRACTITIONER

## 2020-09-28 PROCEDURE — 99233 SBSQ HOSP IP/OBS HIGH 50: CPT | Mod: ,,, | Performed by: PSYCHIATRY & NEUROLOGY

## 2020-09-28 PROCEDURE — 36415 COLL VENOUS BLD VENIPUNCTURE: CPT

## 2020-09-28 PROCEDURE — 25000003 PHARM REV CODE 250: Performed by: STUDENT IN AN ORGANIZED HEALTH CARE EDUCATION/TRAINING PROGRAM

## 2020-09-28 PROCEDURE — 92523 SPEECH SOUND LANG COMPREHEN: CPT

## 2020-09-28 PROCEDURE — 97530 THERAPEUTIC ACTIVITIES: CPT

## 2020-09-28 PROCEDURE — 63600175 PHARM REV CODE 636 W HCPCS: Performed by: UROLOGY

## 2020-09-28 PROCEDURE — 84100 ASSAY OF PHOSPHORUS: CPT

## 2020-09-28 RX ORDER — CLOPIDOGREL BISULFATE 75 MG/1
75 TABLET ORAL DAILY
Qty: 30 TABLET | Refills: 0 | Status: SHIPPED | OUTPATIENT
Start: 2020-09-29 | End: 2020-10-29

## 2020-09-28 RX ORDER — NAPROXEN SODIUM 220 MG/1
81 TABLET, FILM COATED ORAL DAILY
Qty: 360 TABLET | Refills: 0 | Status: SHIPPED | OUTPATIENT
Start: 2020-09-29 | End: 2021-09-29

## 2020-09-28 RX ORDER — ATORVASTATIN CALCIUM 40 MG/1
40 TABLET, FILM COATED ORAL DAILY
Qty: 90 TABLET | Refills: 3 | Status: SHIPPED | OUTPATIENT
Start: 2020-09-29 | End: 2021-09-29

## 2020-09-28 RX ADMIN — HEPARIN SODIUM 5000 UNITS: 5000 INJECTION INTRAVENOUS; SUBCUTANEOUS at 02:09

## 2020-09-28 RX ADMIN — CLOPIDOGREL 75 MG: 75 TABLET, FILM COATED ORAL at 08:09

## 2020-09-28 RX ADMIN — HEPARIN SODIUM 5000 UNITS: 5000 INJECTION INTRAVENOUS; SUBCUTANEOUS at 05:09

## 2020-09-28 RX ADMIN — ASPIRIN 81 MG: 81 TABLET, CHEWABLE ORAL at 08:09

## 2020-09-28 RX ADMIN — ATORVASTATIN CALCIUM 40 MG: 20 TABLET, FILM COATED ORAL at 08:09

## 2020-09-28 NOTE — PLAN OF CARE
Problem: Adult Inpatient Plan of Care  Goal: Plan of Care Review  Outcome: Ongoing, Progressing  Goal: Optimal Comfort and Wellbeing  Outcome: Ongoing, Progressing     Problem: Hemodynamic Instability (Stroke, Ischemic/Transient Ischemic Attack)  Goal: Vital Signs Remain in Desired Range  Outcome: Ongoing, Progressing     Patient is AAO x4, with occasional slurred/dysarthric speech. POC reviewed with patient and wife. Patient verbalized understanding. Patient's breathing is unlabored with equal chest expansion. Patient has slight left sided deficits with a facial droop. Patient denies any numbness and tingling. Patient ambulates to the restroom. Patient remained free from falls. Patient rested well through shift. Bed in lowest position,bed alarm on, side rails up x3, no complaints or signs of distress. WCTM.  See flowsheets for full assessment and VS info.

## 2020-09-28 NOTE — ASSESSMENT & PLAN NOTE
55 y.o. yo male with PMHx skin cancer and HLD who presented to [facility] with left side weakness, left facial droop, and dysarthria. LKN 0730. Telemedicine with Dr. Paz ; tPA given. CTA reviewed images as acquired. CTA completed at the outside hospital with possible RM1 occlusion. Patient transferred to [facility] to be evaluated for possible IR intervention. Upon arrival to [facility] ED, patient with improvement in symptoms. NIH SS 1. CT scan completed with early ischemic changes in right lateral frontal lobe and operculum. Given mild symptoms and early changes on CT- will defer intervention unless patient declines.  MRI confirmed acute infarctions within the right posterior frontal lobe, the insular cortex, and the right basal ganglia. Patient doing well clinically- left facial droop and mild dysarthria. tPA monitoring time has ended without incident. Patient stepped down to stroke primary service. TTE with bubble not avialble on weekend- rescheduled for Monday. PT recommending home. OT recs pending.     Patient may be able to discharge after TTE with bubble.       Antithrombotics for secondary stroke prevention: Antiplatelets: Aspirin: 81 mg daily    Statins for secondary stroke prevention and hyperlipidemia, if present:   Statins: Atorvastatin- 40 mg daily    Aggressive risk factor modification: HLD, history of cancer     Rehab efforts: The patient has been evaluated by a stroke team provider and the therapy needs have been fully considered based off the presenting complaints and exam findings. The following therapy evaluations are needed: PT evaluate and treat, OT evaluate and treat, SLP evaluate and treat, PM&R evaluate for appropriate placement    Diagnostics ordered/pending: TTE to assess cardiac function/status     VTE prophylaxis: Heparin 5000 units SQ every 8 hours    BP parameters: Infarct: Post tPA, SBP <180

## 2020-09-28 NOTE — ASSESSMENT & PLAN NOTE
55 y.o. yo male with PMHx skin cancer and HLD who presented to [facility] with left side weakness, left facial droop, and dysarthria. LKN 0730. Telemedicine with Dr. Paz ; tPA given. CTA reviewed images as acquired. CTA completed at the outside hospital with possible RM1 occlusion. Patient transferred to [facility] to be evaluated for possible IR intervention. Upon arrival to [facility] ED, patient with improvement in symptoms. NIH SS 1. CT scan completed with early ischemic changes in right lateral frontal lobe and operculum. Given mild symptoms and early changes on CT- will defer intervention unless patient declines.  MRI confirmed acute infarctions within the right posterior frontal lobe, the insular cortex, and the right basal ganglia. Patient doing well clinically- left facial droop and mild dysarthria. tPA monitoring time has ended without incident. Patient stepped down to stroke primary service. TTE with bubble did not show any abnormalities.     Patient to get discharged today.     Antithrombotics for secondary stroke prevention: Antiplatelets: Aspirin: 81 mg daily, plavix 75 mg for 30 days    Statins for secondary stroke prevention and hyperlipidemia, if present:   Statins: Atorvastatin- 40 mg daily    Aggressive risk factor modification: HLD, history of cancer     Rehab efforts: The patient has been evaluated by a stroke team provider and the therapy needs have been fully considered based off the presenting complaints and exam findings. The following therapy evaluations are needed: PT evaluate and treat, OT evaluate and treat, SLP evaluate and treat, PM&R evaluate for appropriate placement    Diagnostics ordered/pending: TTE to assess cardiac function/status     VTE prophylaxis: Heparin 5000 units SQ every 8 hours    BP parameters: Infarct: Post tPA, SBP <180

## 2020-09-28 NOTE — PT/OT/SLP EVAL
Occupational Therapy   Evaluation    Name: Dane Rosen  MRN: [id number]  Admitting Diagnosis:  Embolic stroke involving right middle cerebral artery      Recommendations:     Discharge Recommendations: home  Discharge Equipment Recommendations: none  Barriers to discharge:  None    Assessment:     Dane Rosen is a 55 y.o. male with a medical diagnosis of Embolic stroke involving right middle cerebral artery. He presents with the following performance deficits affecting function: decreased coordination, impaired self care skills, decreased upper extremity function. Patient agreeable to therapy evaluation and motivated to return to [facility]. Patient presents with slightly impaired coordination of L UE but WFL. At this time, patient will continue to benefit from acute skilled therapy intervention to address deficits/underlying impairments and progress towards prior level of function.     Rehab Prognosis: Good; patient would benefit from acute skilled OT services to address these deficits and reach maximum level of function.       Plan:     Patient to be seen 3 x/week to address the above listed problems via self-care/home management, therapeutic activities, therapeutic exercises  · Plan of Care Expires: 10/27/20  · Plan of Care Reviewed with: patient, spouse    Subjective     Chief Complaint: difficulty opening items with L hand  Patient/Family Comments/goals: to go home later today    Occupational Profile:  Living Environment: Patient lives with his wife in [facility] with 0 DEV. Bathroom set up: walk in shower with built in seat.  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 Patient found HOB elevated with telemetry and wife present upon OT entry to room.    General Precautions: Standard, fall    Orthopedic Precautions:N/A   Braces: N/A     Occupational Performance:    Bed Mobility:    · Patient completed Supine to Sit with modified independence; HOB elevated  · Patient completed Sit to Supine with modified independence; HOB elevated    Functional Mobility/Transfers:  · Patient completed Sit <> Stand Transfer with independence with no AD  · Patient completed Toilet Transfer Step Transfer technique with supervision with no AD  · Functional Mobility: Patient ambulated within room with supervision with no AD    Balance:  · Sitting: independent  · Standing: supervision     Activities of Daily Living:  · Lower Body Dressing: independence donning socks sitting EOB    Cognitive/Visual Perceptual:  Cognitive/Psychosocial Skills:    -       Oriented to: Person, Place, Time and Situation   -       Follows Commands/attention: Follows multistep commands  -       Memory: No Deficits noted  -       Safety awareness/insight to disability: intact   -       Mood/Affect/Coping skills/emotional control: Appropriate to situation, Cooperative and Pleasant    Physical Exam:  Postural examination/scapula alignment:    -       No postural abnormalities identified  Dominant hand:    -       Right  Upper Extremity Range of Motion:     -       Right Upper Extremity: WNL  -       Left Upper Extremity: WNL but slightly delayed with perform B shoulder ROM  Upper Extremity Strength:    -       Right Upper Extremity: WNL  -       Left Upper Extremity: WFL   Strength:    -       Right Upper Extremity: WNL  -       Left Upper Extremity: WFL  Fine Motor Coordination:   -       R UE: WNL  -       L UE: WFL; requires increased time to manipulate items (opening toothpaste, removing drink lid, etc)  Gross motor coordination:   WFL    AMPAC 6 Click ADL:  AMPAC Total Score: 22    Treatment & Education:   Therapist provided facilitation and instruction of proper body mechanics and fall prevention strategies during tasks listed  above.   Instructed patient and patient's wife on L UE coordination exercises to perform at home.   Instructed patient to sit in bedside chair daily to increase OOB/activity tolerance.   Instructed patient to use call light to have nursing staff assist with needs/transfers.   Discussed OT POC and answered all questions within OT scope of practice.   Whiteboard updated   Education:    Patient left HOB elevated with all lines intact, call button in reach and patient's wife present    GOALS:   Multidisciplinary Problems     Occupational Therapy Goals        Problem: Occupational Therapy Goal    Goal Priority Disciplines Outcome Interventions   Occupational Therapy Goal     OT, PT/OT Ongoing, Progressing    Description: Goals to be met by: 10/5/2020    Patient will increase functional independence with ADLs by performing:    Toileting from toilet with [place] for hygiene and clothing management.   Bathing from  standing at sink with [place].  Upper extremity exercise program x15 reps per handout, with independence.  Patient will demonstrate 100% competency of HEP focusing on L UE coordination.                      History:     Past Medical History:   Diagnosis Date    Cancer     skin       Past Surgical History:   Procedure Laterality Date    SKIN CANCER EXCISION         Time Tracking:     OT Date of Treatment: 09/28/20  OT Start Time: 1049  OT Stop Time: 1105  OT Total Time (min): 16 min    Billable Minutes:Evaluation 8  Therapeutic Activity 8    Mylene Mcdaniel OT  9/28/2020

## 2020-09-28 NOTE — SUBJECTIVE & OBJECTIVE
Neurologic Chief Complaint: left side weakness, dysarthria, and left facial droop    Subjective:     Interval History: TTE with bubble study does not show any PFO. PT/OT recommending home. Patient will get discharged today on DAPT (Plavix only for 30 days) as well as atorvastatin. Outpatient PT and outpatient speech referrals printed and handed to patient.     HPI, Past Medical, Family, and Social History remains the same as documented in the initial encounter.     Review of Systems   HENT: Negative for trouble swallowing.    Respiratory: Negative for shortness of breath and wheezing.    Cardiovascular: Negative for chest pain and palpitations.   Musculoskeletal: Negative for gait problem.   Neurological: Positive for facial asymmetry and speech difficulty. Negative for weakness.   Psychiatric/Behavioral: Negative for confusion and decreased concentration.     Scheduled Meds:   aspirin  81 mg Oral Daily    atorvastatin  40 mg Oral Daily    clopidogreL  75 mg Oral Daily    heparin (porcine)  5,000 Units Subcutaneous Q8H    senna-docusate 8.6-50 mg  1 tablet Oral BID     Continuous Infusions:  PRN Meds:ondansetron, sodium chloride 0.9%    Objective:     Vital Signs (Most Recent):  Temp: 97.9 °F (36.6 °C) (09/28/20 0837)  Pulse: 70 (09/28/20 1220)  Resp: 17 (09/28/20 1220)  BP: (!) 140/69 (09/28/20 1220)  SpO2: 96 % (09/28/20 1220)  BP Location: Right arm    Vital Signs Range (Last 24H):  Temp:  [97.9 °F (36.6 °C)-99.5 °F (37.5 °C)]   Pulse:  [54-76]   Resp:  [12-18]   BP: (123-170)/(69-80)   SpO2:  [94 %-98 %]   BP Location: Right arm    Physical Exam  HENT:      Right Ear: External ear normal.      Left Ear: External ear normal.   Cardiovascular:      Rate and Rhythm: Normal rate.   Pulmonary:      Effort: Pulmonary effort is normal.   Musculoskeletal: Normal range of motion.   Neurological:      Mental Status: He is alert.      Cranial Nerves: Dysarthria and facial asymmetry present.         Neurological Exam:    LOC: alert  Attention Span: Good   Language: No aphasia  Articulation: Dysarthria  Orientation: Person, Place, Time   Visual Fields: Full  EOM (CN III, IV, VI): Full/intact  Pupils (CN II, III): PERRL  Facial Sensation (CN V): Normal  Facial Movement (CN VII): Lower facial weakness on the Left  Motor: Arm left  Normal 5/5  Leg left  Normal 5/5  Arm right  Normal 5/5  Leg right Normal 5/5  Cebellar: No evidence of appendicular or axial ataxia  Sensation: Intact to light touch, temperature and vibration  Tone: Normal tone throughout    Laboratory:  CMP:   Recent Labs   Lab 09/28/20  0503   CALCIUM 8.7   ALBUMIN 3.5   PROT 6.8      K 3.9   CO2 27      BUN 15   CREATININE 1.2   ALKPHOS 62   ALT 18   AST 16   BILITOT 0.5     BMP:   Recent Labs   Lab 09/28/20  0503      K 3.9      CO2 27   BUN 15   CREATININE 1.2   CALCIUM 8.7     CBC:   Recent Labs   Lab 09/28/20  0503   WBC 7.14   RBC 5.13   HGB 15.2   HCT 47.0      MCV 92   MCH 29.6   MCHC 32.3     Lipid Panel:   Recent Labs   Lab 09/26/20  1240   CHOL 239*   LDLCALC 166.0*   HDL 48   TRIG 125     Coagulation:   Recent Labs   Lab 09/26/20  1240   INR 1.1     Platelet Aggregation Study: No results for input(s): PLTAGG, PLTAGINTERP, PLTAGREGLACO, ADPPLTAGGREG in the last 168 hours.  Hgb A1C:   Recent Labs   Lab 09/26/20  1407   HGBA1C 5.3     TSH:   Recent Labs   Lab 09/26/20  1240   TSH 1.702       Diagnostic Results     Brain Imaging      MRI brain w/o 9/27/20    1. Acute infarctions within the right posterior frontal lobe, the insular cortex, and the right basal ganglia.  2. Associated focus of susceptibility compatible with thrombosis of a right M2 segment.    CT head w/o contrast 9/26/20   Subtle hypoattenuation in the right lateral frontal lobe and operculum suspicious for early ischemic changes.  Recommend MRI brain for further evaluation.         Vessel Imaging   CTA completed at OSH with RM1    Cardiac Imaging     TTE with bubble  study:   · Normal limited air contrast study without shunt.  · Normal biventricular size and function , LVEF 60-65%, and normal atrial sizes and mitral and tricuspid valves

## 2020-09-28 NOTE — PROGRESS NOTES
[facility]  Vascular Neurology  Comprehensive Stroke Center  Progress Note    Assessment/Plan:     * Embolic stroke involving right middle cerebral artery  55 y.o. yo male with PMHx skin cancer and HLD who presented to [facility] with left side weakness, left facial droop, and dysarthria. LKN 0730. Telemedicine with Dr. Paz ; tPA given. CTA reviewed images as acquired. CTA completed at the outside hospital with possible RM1 occlusion. Patient transferred [place] [facility] [place] for possible IR intervention. Upon arrival to [facility] [place], patient with improvement in symptoms. NIH SS 1. CT scan completed with early ischemic changes in right lateral frontal lobe and operculum. Given mild symptoms and early changes on CT- will defer intervention unless patient declines.  MRI confirmed acute infarctions within the right posterior frontal lobe, the insular cortex, and the right basal ganglia. Patient doing well clinically- left facial droop and mild dysarthria. tPA monitoring time has ended without incident. Patient stepped down to stroke primary service. TTE with bubble did not show any abnormalities.     Patient to get discharged today.     Antithrombotics for secondary stroke prevention: Antiplatelets: Aspirin: 81 mg daily, plavix 75 mg for 30 days    Statins for secondary stroke prevention and hyperlipidemia, if present:   Statins: Atorvastatin- 40 mg daily    Aggressive risk factor modification: HLD, history of cancer     Rehab efforts: The patient has been evaluated by a stroke team provider and the therapy needs have been fully considered based off the presenting complaints and exam findings. The following therapy evaluations are needed: PT evaluate and treat, OT evaluate and treat, SLP evaluate and treat, PM&R evaluate for appropriate placement    Diagnostics ordered/pending: TTE to assess cardiac function/status     VTE prophylaxis: Heparin 5000 units SQ every 8 hours    BP  parameters: Infarct: Post tPA, SBP <180        Hyperlipemia  -stroke risk factor    Atorvastatin 40 mg         MRI confirmed acute infarctions within the right posterior frontal lobe, the insular cortex, and the right basal ganglia. Patient doing well clinically- left facial droop and mild dysarthria. tPA monitoring time has ended without incident. Patient stepped down to stroke primary service on 9/27. TTE with bubble study does not show any PFO. PT/OT recommending home. Patient will get discharged today on DAPT (Plavix only for 30 days) as well as atorvastatin. Outpatient PT and outpatient speech referrals printed and handed to patient.     STROKE DOCUMENTATION   Acute Stroke Times   Last Known Normal Date: 09/26/20  Last Known Normal Time: 0730  Symptom Onset Date: 09/26/20  Symptom Onset Time: 0730  Stroke Team Called Date: 09/26/20  Stroke Team Called Time: 1213  Stroke Team Arrival Date: 09/26/20  Stroke Team Arrival Time: 1217  CT Interpretation Time: 1222  Decision to Treat Time for Alteplase: (N/A done at OSH)  Decision to Treat Time for IR: (N/A)    NIH Scale:  1a. Level of Consciousness: 0-->Alert, keenly responsive  1b. LOC Questions: 0-->Answers both questions correctly  1c. LOC Commands: 0-->Performs both tasks correctly  2. Best Gaze: 0-->Normal  3. Visual: 0-->No visual loss  4. Facial Palsy: 1-->Minor paralysis (flattened nasolabial fold, asymmetry on smiling)  5a. Motor Arm, Left: 0-->No drift, limb holds 90 (or 45) degrees for full 10 secs  5b. Motor Arm, Right: 0-->No drift, limb holds 90 (or 45) degrees for full 10 secs  6a. Motor Leg, Left: 0-->No drift, leg holds 30 degree position for full 5 secs  6b. Motor Leg, Right: 0-->No drift, leg holds 30 degree position for full 5 secs  7. Limb Ataxia: 0-->Absent  8. Sensory: 0-->Normal, no sensory loss  9. Best Language: 0-->No aphasia, normal  10. Dysarthria: 1-->Mild-to-moderate dysarthria, patient slurs at least some words and, at worst,  can be understood with some difficulty  11. Extinction and Inattention (formerly Neglect): 0-->No abnormality  Total (NIH Stroke Scale): 2       Modified [place] Score: 0  [place] Coma Scale:15   ABCD2 Score:    [id number] Score:   HAS -BLED Score:   ICH Score:   Hunt & Tam Classification:      Hemorrhagic change of an Ischemic Stroke: Does this patient have an ischemic stroke with hemorrhagic changes? No     Neurologic Chief Complaint: left side weakness, dysarthria, and left facial droop    Subjective:     Interval History: TTE with bubble study does not show any PFO. PT/OT recommending home. Patient will get discharged today on DAPT (Plavix only for 30 days) as well as atorvastatin. Outpatient PT and outpatient speech referrals printed and handed to patient.     HPI, Past Medical, Family, and Social History remains the same as documented in the initial encounter.     Review of Systems   HENT: Negative for trouble swallowing.    Respiratory: Negative for shortness of breath and wheezing.    Cardiovascular: Negative for chest pain and palpitations.   Musculoskeletal: Negative for gait problem.   Neurological: Positive for facial asymmetry and speech difficulty. Negative for weakness.   Psychiatric/Behavioral: Negative for confusion and decreased concentration.     Scheduled Meds:   aspirin  81 mg Oral Daily    atorvastatin  40 mg Oral Daily    clopidogreL  75 mg Oral Daily    heparin (porcine)  5,000 Units Subcutaneous Q8H    senna-docusate 8.6-50 mg  1 tablet Oral BID     Continuous Infusions:  PRN Meds:ondansetron, sodium chloride 0.9%    Objective:     Vital Signs (Most Recent):  Temp: 97.9 °F (36.6 °C) (09/28/20 0837)  Pulse: 70 (09/28/20 1220)  Resp: 17 (09/28/20 1220)  BP: (!) 140/69 (09/28/20 1220)  SpO2: 96 % (09/28/20 1220)  BP Location: Right arm    Vital Signs Range (Last 24H):  Temp:  [97.9 °F (36.6 °C)-99.5 °F (37.5 °C)]   Pulse:  [54-76]   Resp:  [12-18]   BP: (123-170)/(69-80)   SpO2:  [94  %-98 %]   BP Location: Right arm    Physical Exam  HENT:      Right Ear: External ear normal.      Left Ear: External ear normal.   Cardiovascular:      Rate and Rhythm: Normal rate.   Pulmonary:      Effort: Pulmonary effort is normal.   Musculoskeletal: Normal range of motion.   Neurological:      Mental Status: He is alert.      Cranial Nerves: Dysarthria and facial asymmetry present.         Neurological Exam:   LOC: alert  Attention Span: Good   Language: No aphasia  Articulation: Dysarthria  Orientation: Person, Place, Time   Visual Fields: Full  EOM (CN III, IV, VI): Full/intact  Pupils (CN II, III): PERRL  Facial Sensation (CN V): Normal  Facial Movement (CN VII): Lower facial weakness on the Left  Motor: Arm left  Normal 5/5  Leg left  Normal 5/5  Arm right  Normal 5/5  Leg right Normal 5/5  Cebellar: No evidence of appendicular or axial ataxia  Sensation: Intact to light touch, temperature and vibration  Tone: Normal tone throughout    Laboratory:  CMP:   Recent Labs   Lab 09/28/20  0503   CALCIUM 8.7   ALBUMIN 3.5   PROT 6.8      K 3.9   CO2 27      BUN 15   CREATININE 1.2   ALKPHOS 62   ALT 18   AST 16   BILITOT 0.5     BMP:   Recent Labs   Lab 09/28/20  0503      K 3.9      CO2 27   BUN 15   CREATININE 1.2   CALCIUM 8.7     CBC:   Recent Labs   Lab 09/28/20  0503   WBC 7.14   RBC 5.13   HGB 15.2   HCT 47.0      MCV 92   MCH 29.6   MCHC 32.3     Lipid Panel:   Recent Labs   Lab 09/26/20  1240   CHOL 239*   LDLCALC 166.0*   HDL 48   TRIG 125     Coagulation:   Recent Labs   Lab 09/26/20  1240   INR 1.1     Platelet Aggregation Study: No results for input(s): PLTAGG, PLTAGINTERP, PLTAGREGLACO, ADPPLTAGGREG in the last 168 hours.  Hgb A1C:   Recent Labs   Lab 09/26/20  1407   HGBA1C 5.3     TSH:   Recent Labs   Lab 09/26/20  1240   TSH 1.702       Diagnostic Results     Brain Imaging      MRI brain w/o 9/27/20    1. Acute infarctions within the right posterior frontal lobe,  the insular cortex, and the right basal ganglia.  2. Associated focus of susceptibility compatible with thrombosis of a right M2 segment.    CT head w/o contrast 9/26/20   Subtle hypoattenuation in the right lateral frontal lobe and operculum suspicious for early ischemic changes.  Recommend MRI brain for further evaluation.         Vessel Imaging   CTA completed at OSH with RM1    Cardiac Imaging     TTE with bubble study:   · Normal limited air contrast study without shunt.  · Normal biventricular size and function , LVEF 60-65%, and normal atrial sizes and mitral and tricuspid valves      Evita Vicente MD  Comprehensive Stroke Center  Department of Vascular Neurology   [facility]

## 2020-09-28 NOTE — ASSESSMENT & PLAN NOTE
55 y.o. yo male with PMHx skin cancer and HLD who presented to [facility] with left side weakness, left facial droop, and dysarthria. LKN 0730. Telemedicine with Dr. Paz ; tPA given. CTA reviewed images as acquired. CTA completed at the outside hospital with possible RM1 occlusion. Patient transferred to [facility] to be evaluated for possible IR intervention. Upon arrival to [facility] ED, patient with improvement in symptoms. NIH SS 1. CT scan completed with early ischemic changes in right lateral frontal lobe and operculum. Given mild symptoms and early changes on CT- will defer intervention unless patient declines.  MRI confirmed acute infarctions within the right posterior frontal lobe, the insular cortex, and the right basal ganglia. Patient doing well clinically- left facial droop and mild dysarthria. tPA monitoring time has ended without incident. Patient stepped down to stroke primary service. TTE with bubble did not show any abnormalities.     Patient to get discharged today.     Antithrombotics for secondary stroke prevention: Antiplatelets: Aspirin: 81 mg daily, plavix 75 mg for 30 days    Statins for secondary stroke prevention and hyperlipidemia, if present:   Statins: Atorvastatin- 40 mg daily    Aggressive risk factor modification: HLD, history of cancer     Rehab efforts: The patient has been evaluated by a stroke team provider and the therapy needs have been fully considered based off the presenting complaints and exam findings. The following therapy evaluations are needed: PT evaluate and treat, OT evaluate and treat, SLP evaluate and treat, PM&R evaluate for appropriate placement    Diagnostics ordered/pending: TTE to assess cardiac function/status     VTE prophylaxis: Heparin 5000 units SQ every 8 hours    BP parameters: Infarct: Post tPA, SBP <180

## 2020-09-28 NOTE — PLAN OF CARE
Problem: SLP Goal  Goal: SLP Goal  Description: Speech Language Pathology Goals  Goals expected to be met by 10/5:  1. Pt will tolerate regular consistency diet and thin liquids without s/s of aspiration.   2. Pt will perform OME's x 10 to increase oral motor strength and ROM.   3. Pt will participate in speech/language/cognitive evaluation to determine need for further intervention.-met    Outcome: Ongoing, Progressing     Speech/language and cognitive evaluation completed. ST rec continued diet of regular solids and thin liquids. ST to continue to monitor.    SABIHA Castellon   9/28/2020

## 2020-09-28 NOTE — DISCHARGE SUMMARY
"[facility]  Vascular Neurology  Comprehensive Stroke Center  Discharge Summary     Summary:     Admit Date: 9/26/2020 12:13 PM    Discharge Date and Time:  09/28/2020 2:28 PM    Attending Physician: Darrell Porras MD     Discharge Provider: Evita Vicente MD    History of Present Illness: Dane Rosen is a 55 y.o. male with PMHx skin cancer who is being evaluated by the Vascular Neurology service after developing left side weakness, left facial droop and slurred speech. Pt was LKN at approximately 7:30 am. Adalid had been alone in office, went to drink and had difficulty swallowing. After coughing spell, he suddenly became weak on left side. He then noticed his speech was slurred and face was drooping on the left side. Patient called his wife. EMS was activated. Patient was transported to [facility]. Telestroke consult done- tPA given. CTA completed at the outside hospital with possible RM1 occlusion. [place] [facility] [place] possible IR intervention.     Pt denies personal hx blood clots, denies hx cardiac arrhythmia and denies use of antithrombotics at home Patient reports being told he has "thick blood" when having labs drawn. He lives with others and performs all ADLs independently. Pt drinks alcohol occasionally.        Hospital Course (synopsis of major diagnoses, care, treatment, and services provided during the course of the hospital stay): MRI confirmed acute infarctions within the right posterior frontal lobe, the insular cortex, and the right basal ganglia. Patient doing well clinically- left facial droop and mild dysarthria. tPA monitoring time has ended without incident. Patient stepped down to stroke primary service on 9/27. TTE with bubble study does not show any PFO. PT/OT recommending home. Patient will get discharged today on DAPT (Plavix only for 30 days) as well as atorvastatin. Outpatient PT and outpatient speech referrals printed and " handed to patient.     Stroke Etiology: Probable Small Artery Occlusion (SHANIKA)    STROKE DOCUMENTATION   Acute Stroke Times   Last Known Normal Date: 09/26/20  Last Known Normal Time: 0730  Symptom Onset Date: 09/26/20  Symptom Onset Time: 0730  Stroke Team Called Date: 09/26/20  Stroke Team Called Time: 1213  Stroke Team Arrival Date: 09/26/20  Stroke Team Arrival Time: 1217  CT Interpretation Time: 1222  Decision to Treat Time for Alteplase: (N/A done at OSH)  Decision to Treat Time for IR: (N/A)     NIH Scale:  1a. Level of Consciousness: 0-->Alert, keenly responsive  1b. LOC Questions: 0-->Answers both questions correctly  1c. LOC Commands: 0-->Performs both tasks correctly  2. Best Gaze: 0-->Normal  3. Visual: 0-->No visual loss  4. Facial Palsy: 1-->Minor paralysis (flattened nasolabial fold, asymmetry on smiling)  5a. Motor Arm, Left: 0-->No drift, limb holds 90 (or 45) degrees for full 10 secs  5b. Motor Arm, Right: 0-->No drift, limb holds 90 (or 45) degrees for full 10 secs  6a. Motor Leg, Left: 0-->No drift, leg holds 30 degree position for full 5 secs  6b. Motor Leg, Right: 0-->No drift, leg holds 30 degree position for full 5 secs  7. Limb Ataxia: 0-->Absent  8. Sensory: 0-->Normal, no sensory loss  9. Best Language: 0-->No aphasia, normal  10. Dysarthria: 1-->Mild-to-moderate dysarthria, patient slurs at least some words and, at worst, can be understood with some difficulty  11. Extinction and Inattention (formerly Neglect): 0-->No abnormality  Total (NIH Stroke Scale): 2        Modified [place] Score: 0  Thuan Coma Scale:15   ABCD2 Score:    [id number] Score:   HAS -BLED Score:   ICH Score:   Hunt & Tam Classification:       Assessment/Plan:        Diagnostic Results      Brain Imaging       MRI brain w/o 9/27/20     1. Acute infarctions within the right posterior frontal lobe, the insular cortex, and the right basal ganglia.  2. Associated focus of susceptibility compatible with thrombosis of a  right M2 segment.     CT head w/o contrast 9/26/20   Subtle hypoattenuation in the right lateral frontal lobe and operculum suspicious for early ischemic changes.  Recommend MRI brain for further evaluation.           Vessel Imaging   CTA completed at OSH with RM1     Cardiac Imaging      TTE with bubble study:   · Normal limited air contrast study without shunt.  · Normal biventricular size and function , LVEF 60-65%, and normal atrial sizes and mitral and tricuspid valves       Interventions: IV t-PA    Complications: None    Disposition:     Final Active Diagnoses:    Diagnosis Date Noted POA    PRINCIPAL PROBLEM:  Embolic stroke involving right middle cerebral artery [I63.411] 09/26/2020 Yes    Hyperlipemia [E78.5] 09/26/2020 Unknown      Problems Resolved During this Admission:    Diagnosis Date Noted Date Resolved POA    Cerebrovascular accident (CVA) due to thrombosis of right middle cerebral artery [I63.311] 09/26/2020 09/28/2020 Yes     * Embolic stroke involving right middle cerebral artery  55 y.o. yo male with PMHx skin cancer and HLD who presented to [facility] with left side weakness, left facial droop, and dysarthria. LKN 0730. Telemedicine with Dr. Paz ; tPA given. CTA reviewed images as acquired. CTA completed at the outside hospital with possible RM1 occlusion. Patient transferred to [facility] [place] to be evaluated for possible IR intervention. Upon arrival to [facility] ED, patient with improvement in symptoms. NIH SS 1. CT scan completed with early ischemic changes in right lateral frontal lobe and operculum. Given mild symptoms and early changes on CT- will defer intervention unless patient declines.  MRI confirmed acute infarctions within the right posterior frontal lobe, the insular cortex, and the right basal ganglia. Patient doing well clinically- left facial droop and mild dysarthria. tPA monitoring time has ended without incident. Patient stepped down to stroke primary service. TTE with  bubble did not show any abnormalities.     Patient to get discharged today.     Antithrombotics for secondary stroke prevention: Antiplatelets: Aspirin: 81 mg daily, plavix 75 mg for 30 days    Statins for secondary stroke prevention and hyperlipidemia, if present:   Statins: Atorvastatin- 40 mg daily    Aggressive risk factor modification: HLD, history of cancer     Rehab efforts: The patient has been evaluated by a stroke team provider and the therapy needs have been fully considered based off the presenting complaints and exam findings. The following therapy evaluations are needed: PT evaluate and treat, OT evaluate and treat, SLP evaluate and treat, PM&R evaluate for appropriate placement    Diagnostics ordered/pending: TTE to assess cardiac function/status     VTE prophylaxis: Heparin 5000 units SQ every 8 hours    BP parameters: Infarct: Post tPA, SBP <180        Hyperlipemia  -stroke risk factor    Atorvastatin 40 mg        Recommendations:     Post-discharge complication risks: None    Stroke Education given to: patient    Follow-up in Stroke Clinic in 30 days.     Discharge Plan:  Antithrombotics: Aspirin 81 mg, plavix 75 mg    Statin: Atorvastatin 40 mg    Follow Up:  Follow-up Information     Primary Doctor No.    Why: Outpatient Services                 Patient Instructions:      Ambulatory referral/consult to Physical/Occupational Therapy   Standing Status: Future   Referral Priority: Routine Referral Type: Physical Medicine   Referral Reason: Specialty Services Required   Requested Specialty: Physical Therapy   Number of Visits Requested: 1     Ambulatory referral/consult to Speech Therapy   Standing Status: Future   Referral Priority: Routine Referral Type: Speech Therapy   Referral Reason: Specialty Services Required   Requested Specialty: Speech Pathology   Number of Visits Requested: 1     Ambulatory referral/consult to Vascular Neurology   Standing Status: Future   Referral Priority:  Routine Referral Type: Consultation   Referral Reason: Specialty Services Required   Requested Specialty: Vascular Neurology   Number of Visits Requested: 1       Medications:  Reconciled Home Medications:      Medication List      START taking these medications    aspirin 81 MG Chew  Take 1 tablet (81 mg total) by mouth once daily.  Start taking on: September 29, 2020     atorvastatin 40 MG tablet  Commonly known as: LIPITOR  Take 1 tablet (40 mg total) by mouth once daily.  Start taking on: September 29, 2020     clopidogreL 75 mg tablet  Commonly known as: PLAVIX  Take 1 tablet (75 mg total) by mouth once daily.  Start taking on: September 29, 2020            Evita Vicente MD  Comprehensive Stroke Center  Department of Vascular Neurology   [facility]

## 2020-09-28 NOTE — PLAN OF CARE
JUJU razo completed and goals established 9/28/2020    Problem: Occupational Therapy Goal  Goal: Occupational Therapy Goal  Description: Goals to be met by: 10/5/2020    Patient will increase functional independence with ADLs by performing:    Toileting from toilet with [place] for hygiene and clothing management.   Bathing from  standing at sink with [place].  Upper extremity exercise program x15 reps per handout, with independence.  Patient will demonstrate 100% competency of HEP focusing on L UE coordination.     Outcome: Ongoing, Progressing

## 2020-09-28 NOTE — PROGRESS NOTES
[facility]  Vascular Neurology  Comprehensive Stroke Center  Progress Note    Assessment/Plan:     * Embolic stroke involving right middle cerebral artery  55 y.o. yo male with PMHx skin cancer and HLD who presented to [facility] with left side weakness, left facial droop, and dysarthria. LKN 0730. Telemedicine with Dr. Paz ; tPA given. CTA reviewed images as acquired. CTA completed at the outside hospital with possible RM1 occlusion. Patient transferred to [facility] [place] to be evaluated for possible IR intervention. Upon arrival to [facility] ED, patient with improvement in symptoms. NIH SS 1. CT scan completed with early ischemic changes in right lateral frontal lobe and operculum. Given mild symptoms and early changes on CT- will defer intervention unless patient declines.  MRI confirmed acute infarctions within the right posterior frontal lobe, the insular cortex, and the right basal ganglia. Patient doing well clinically- left facial droop and mild dysarthria. tPA monitoring time has ended without incident. Patient stepped down to stroke primary service. TTE with bubble not avialble on weekend- rescheduled for Monday. PT recommending home. OT recs pending.     Patient may be able to discharge after TTE with bubble.       Antithrombotics for secondary stroke prevention: Antiplatelets: Aspirin: 81 mg daily    Statins for secondary stroke prevention and hyperlipidemia, if present:   Statins: Atorvastatin- 40 mg daily    Aggressive risk factor modification: HLD, history of cancer     Rehab efforts: The patient has been evaluated by a stroke team provider and the therapy needs have been fully considered based off the presenting complaints and exam findings. The following therapy evaluations are needed: PT evaluate and treat, OT evaluate and treat, SLP evaluate and treat, PM&R evaluate for appropriate placement    Diagnostics ordered/pending: TTE to assess cardiac function/status     VTE  prophylaxis: Heparin 5000 units SQ every 8 hours    BP parameters: Infarct: Post tPA, SBP <180        Hyperlipemia  -stroke risk factor    Atorvastatin 40 mg       9/27/20 MRI confirmed acute infarctions within the right posterior frontal lobe, the insular cortex, and the right basal ganglia. Patient doing well clinically- left facial droop and mild dysarthria. tPA monitoring time has ended without incident. Patient stepped down to stroke primary service. TTE with bubble not avialble on weekend- rescheduled for Monday. PT recommending home. OT recs pending. Patient may be able to discharge after TTE with bubble.     STROKE DOCUMENTATION   Acute Stroke Times   Last Known Normal Date: 09/26/20  Last Known Normal Time: 0730  Symptom Onset Date: 09/26/20  Symptom Onset Time: 0730  Stroke Team Called Date: 09/26/20  Stroke Team Called Time: 1213  Stroke Team Arrival Date: 09/26/20  Stroke Team Arrival Time: 1217  CT Interpretation Time: 1222  Decision to Treat Time for Alteplase: (N/A done at OSH)  Decision to Treat Time for IR: (N/A)    NIH Scale:  1a. Level of Consciousness: 0-->Alert, keenly responsive  1b. LOC Questions: 0-->Answers both questions correctly  1c. LOC Commands: 0-->Performs both tasks correctly  2. Best Gaze: 0-->Normal  3. Visual: 0-->No visual loss  4. Facial Palsy: 1-->Minor paralysis (flattened nasolabial fold, asymmetry on smiling)  5a. Motor Arm, Left: 0-->No drift, limb holds 90 (or 45) degrees for full 10 secs  5b. Motor Arm, Right: 0-->No drift, limb holds 90 (or 45) degrees for full 10 secs  6a. Motor Leg, Left: 0-->No drift, leg holds 30 degree position for full 5 secs  6b. Motor Leg, Right: 0-->No drift, leg holds 30 degree position for full 5 secs  7. Limb Ataxia: 0-->Absent  8. Sensory: 0-->Normal, no sensory loss  9. Best Language: 0-->No aphasia, normal  10. Dysarthria: 1-->Mild-to-moderate dysarthria, patient slurs at least some words and, at worst, can be understood with some  difficulty  11. Extinction and Inattention (formerly Neglect): 0-->No abnormality  Total (NIH Stroke Scale): 2       Modified [place] Score: 0  Thuan Coma Scale:    ABCD2 Score:    [id number] Score:   HAS -BLED Score:   ICH Score:   Hunt & Tam Classification:      Hemorrhagic change of an Ischemic Stroke: Does this patient have an ischemic stroke with hemorrhagic changes? No     Neurologic Chief Complaint: left side weakness, dysarthria, and left facial droop    Subjective:     Interval History: Patient is seen for follow-up neurological assessment and treatment recommendations:   MRI confirmed acute infarctions within the right posterior frontal lobe, the insular cortex, and the right basal ganglia. Patient doing well clinically- left facial droop and mild dysarthria. tPA monitoring time has ended without incident. Patient stepped down to stroke primary service. TTE with bubble not avialble on weekend- rescheduled for Monday. PT recommending home. OT recs pending. Patient may be able to discharge after TTE with bubble.     HPI, Past Medical, Family, and Social History remains the same as documented in the initial encounter.     Review of Systems   HENT: Negative for trouble swallowing.    Respiratory: Negative for shortness of breath and wheezing.    Cardiovascular: Negative for chest pain and palpitations.   Musculoskeletal: Negative for gait problem.   Neurological: Positive for facial asymmetry and speech difficulty. Negative for weakness.   Psychiatric/Behavioral: Negative for confusion and decreased concentration.     Scheduled Meds:   [START ON 9/28/2020] aspirin  81 mg Oral Daily    atorvastatin  40 mg Oral Daily    heparin (porcine)  5,000 Units Subcutaneous Q8H    senna-docusate 8.6-50 mg  1 tablet Oral BID     Continuous Infusions:  PRN Meds:magnesium oxide, ondansetron, potassium chloride, potassium, sodium phosphates, sodium chloride 0.9%    Objective:     Vital Signs (Most Recent):  Temp: 99.5  °F (37.5 °C) (09/27/20 1502)  Pulse: 76 (09/27/20 1602)  Resp: 12 (09/27/20 1602)  BP: (!) 146/80 (09/27/20 1602)  SpO2: (!) 94 % (09/27/20 1602)  BP Location: Right arm    Vital Signs Range (Last 24H):  Temp:  [98.1 °F (36.7 °C)-99.5 °F (37.5 °C)]   Pulse:  [59-97]   Resp:  [10-22]   BP: (106-157)/(55-87)   SpO2:  [94 %-98 %]   BP Location: Right arm    Physical Exam  HENT:      Right Ear: External ear normal.      Left Ear: External ear normal.   Cardiovascular:      Rate and Rhythm: Normal rate.   Pulmonary:      Effort: Pulmonary effort is normal.   Musculoskeletal: Normal range of motion.   Neurological:      Mental Status: He is alert.      Cranial Nerves: Dysarthria and facial asymmetry present.         Neurological Exam:   LOC: alert  Attention Span: Good   Language: No aphasia  Articulation: Dysarthria  Orientation: Person, Place, Time   Visual Fields: Full  EOM (CN III, IV, VI): Full/intact  Pupils (CN II, III): PERRL  Facial Sensation (CN V): Normal  Facial Movement (CN VII): Lower facial weakness on the Left  Motor: Arm left  Normal 5/5  Leg left  Normal 5/5  Arm right  Normal 5/5  Leg right Normal 5/5  Cebellar: No evidence of appendicular or axial ataxia  Sensation: Intact to light touch, temperature and vibration  Tone: Normal tone throughout    Laboratory:  CMP:   Recent Labs   Lab 09/27/20 0113   CALCIUM 7.7*   ALBUMIN 3.1*   PROT 6.0      K 3.8   CO2 20*   *   BUN 12   CREATININE 0.9   ALKPHOS 52*   ALT 19   AST 15   BILITOT 0.5     BMP:   Recent Labs   Lab 09/27/20 0113      K 3.8   *   CO2 20*   BUN 12   CREATININE 0.9   CALCIUM 7.7*     CBC:   Recent Labs   Lab 09/27/20 0113   WBC 8.18   RBC 4.59*   HGB 13.6*   HCT 43.3      MCV 94   MCH 29.6   MCHC 31.4*     Lipid Panel:   Recent Labs   Lab 09/26/20  1240   CHOL 239*   LDLCALC 166.0*   HDL 48   TRIG 125     Coagulation:   Recent Labs   Lab 09/26/20  1240   INR 1.1     Platelet Aggregation Study: No results for  input(s): PLTAGG, PLTAGINTERP, PLTAGREGLACO, ADPPLTAGGREG in the last 168 hours.  Hgb A1C:   Recent Labs   Lab 09/26/20  1407   HGBA1C 5.3     TSH:   Recent Labs   Lab 09/26/20  1240   TSH 1.702       Diagnostic Results     Brain Imaging      MRI brain w/o 9/27/20    1. Acute infarctions within the right posterior frontal lobe, the insular cortex, and the right basal ganglia.  2. Associated focus of susceptibility compatible with thrombosis of a right M2 segment.    CT head w/o contrast 9/26/20   Subtle hypoattenuation in the right lateral frontal lobe and operculum suspicious for early ischemic changes.  Recommend MRI brain for further evaluation.         Vessel Imaging   CTA completed at OSH with RM1    Cardiac Imaging     TTE with bubble pending       Aissatou Fernández NP  Comprehensive Stroke Center  Department of Vascular Neurology   [facility]

## 2020-09-28 NOTE — PT/OT/SLP EVAL
"Speech Language Pathology Evaluation  Cognitive Communication    Patient Name:  Dane Rosen   MRN:  [id number]  Admitting Diagnosis: Embolic stroke involving right middle cerebral artery    Recommendations:     Recommendations:                General Recommendations:  Speech/language therapy  Diet recommendations:  Regular, Thin   Aspiration Precautions: Standard aspiration precautions   General Precautions: Standard, fall  Communication strategies:  none    History:     Past Medical History:   Diagnosis Date    Cancer     skin       Past Surgical History:   Procedure Laterality Date    SKIN CANCER EXCISION       Social History: Patient lives with wife.    Prior diet: reg/thin.    Subjective     Pt awake and alert upon SLP entry. Wife present at bedside. Pt reported difficulty with speech, stating "it's difficult for my speech to flow." Wife agreed pt not at baseline.    Objective:   Cognitive Status:    Arousal/Alertness Appropriate response to stimuli  Orientation Oriented x4  Memory Immediate Recall 100% acc and Delayed x1/3 independently, increased to x2/3 with semantic cue  Problem Solving Sequencing 100% acc, Solutions 100% acc and Compare/contrast 100% acc      Receptive Language:   Comprehension:   Questions Complex yes/no 100% acc  Commands  two step basic commands 100% acc  multistep basic commands 100% acc    Expressive Language:  Verbal:    Repetition Words 100% acc and Phrases 100% acc  Naming Convergent 100% acc, Divergent responsive 16 items in 1 minute (15 normal) and Single word responsive naming 100% acc    Expressive language c/b short and generic utterances, possibly 2/2 mild aphasia    Motor Speech:  WFL    Voice:   WFL    Visual-Spatial:  tba    Reading:   Sentence WFL     Written Expression:   Sentence formulation WFL    Treatment: Pt with cognitive abilities at/near baseline. Pt reported difficulty with speech production, however simple and short utterances likely 2/2 mild aphasia vs " apraxia/dysarthria. Pt observed self-feeding solids and thin liquids with adequate oral phase and no signs of airway compromise c/b no throat clear, cough, or change in vocal quality. Pt reported good tolerance of current diet and no difficulty with swallowing. Education provided regarding role of SLP, continued diet recommendations, and purpose/benefit of outpatient ST services. All questions within SLP scope addressed. Pt and spouse verbalized understanding and agreement.    Assessment:   Dane Rosen is a 55 y.o. male with an SLP diagnosis of mild Aphasia.     Goals:   Multidisciplinary Problems     SLP Goals        Problem: SLP Goal    Goal Priority Disciplines Outcome   SLP Goal     SLP Ongoing, Progressing   Description: Speech Language Pathology Goals  Goals expected to be met by 10/5:  1. Pt will tolerate regular consistency diet and thin liquids without s/s of aspiration.   2. Pt will perform OME's x 10 to increase oral motor strength and ROM.   3. Pt will participate in speech/language/cognitive evaluation to determine need for further intervention.-met                         Plan:   · Patient to be seen:  4 x/week   · Plan of Care expires:  10/27/20  · Plan of Care reviewed with:  patient, spouse   · SLP Follow-Up:  Yes       Discharge recommendations:  Discharge Facility/Level of Care Needs: outpatient speech therapy   Barriers to Discharge:  None    Time Tracking:   SLP Treatment Date:   09/28/20  Speech Start Time:  1020  Speech Stop Time:  1030     Speech Total Time (min):  10 min    Billable Minutes: Charlotte Kline GAIL  09/28/2020

## 2020-09-28 NOTE — PLAN OF CARE
Patient medically ready for discharge to home with outpatient therapy orders. Any necessary transport setup by . This CM scheduled or requested necessary follow-up appointments. Family/patient aware of discharge.    No future appointments.       09/28/20 1421   Final Note   Assessment Type Final Discharge Note   Anticipated Discharge Disposition Home   Hospital Follow Up  Appt(s) scheduled? No  (spouse to establish with new PCP once discharged)   Discharge plans and expectations educations in teach back method with documentation complete? Yes   Right Care Referral Info   Post Acute Recommendation No Care   Post-Acute Status   Post-Acute Authorization Other   Other Status No Post-Acute Service Needs   Discharge Delays None known at this time       Neelam Walker RN  Case Management  Ext: [phone]  09/28/2020

## 2020-09-28 NOTE — PLAN OF CARE
Problem: Adult Inpatient Plan of Care  Goal: Plan of Care Review  Outcome: Met  Goal: Patient-Specific Goal (Individualization)  Outcome: Met  Goal: Absence of Hospital-Acquired Illness or Injury  Outcome: Met  Goal: Optimal Comfort and Wellbeing  Outcome: Met  Goal: Readiness for Transition of Care  Outcome: Met  Goal: Rounds/Family Conference  Outcome: Met     Problem: Adjustment to Illness (Stroke, Ischemic/Transient Ischemic Attack)  Goal: Optimal Coping  Outcome: Met     Problem: Bowel Elimination Impaired (Stroke, Ischemic/Transient Ischemic Attack)  Goal: Effective Bowel Elimination  Outcome: Met     Problem: Cerebral Tissue Perfusion Risk (Stroke, Ischemic/Transient Ischemic Attack)  Goal: Optimal Cerebral Tissue Perfusion  Outcome: Met     Problem: Communication Impairment (Stroke, Ischemic/Transient Ischemic Attack)  Goal: Improved Communication Skills  Outcome: Met     Problem: Eating/Swallowing Impairment (Stroke, Ischemic/Transient Ischemic Attack)  Goal: Oral Intake without Aspiration  Outcome: Met     Problem: Functional Ability Impaired (Stroke, Ischemic/Transient Ischemic Attack)  Goal: Optimal Functional Ability  Outcome: Met     Problem: Hemodynamic Instability (Stroke, Ischemic/Transient Ischemic Attack)  Goal: Vital Signs Remain in Desired Range  Outcome: Met     Problem: Pain (Stroke, Ischemic/Transient Ischemic Attack)  Goal: Acceptable Pain Control  Outcome: Met     Problem: Sensorimotor Impairment (Stroke, Ischemic/Transient Ischemic Attack)  Goal: Improved Sensorimotor Function  Outcome: Met     Problem: Urinary Elimination Impaired (Stroke, Ischemic/Transient Ischemic Attack)  Goal: Effective Urinary Elimination  Outcome: Met     Problem: Fall Injury Risk  Goal: Absence of Fall and Fall-Related Injury  Outcome: Met       AVS reviewed w/ patient/wife. Patient and wife verbalized understanding of education. All comments and concerns addressed. PIV x 1 & telemetry removed. VSS at discharge.  All belongings sent with pt via private vehicle, transported via wheelchair. WCTM.

## 2020-09-28 NOTE — PLAN OF CARE
CM met with patient and spouse in room for Discharge Planning Assessment.  Per patient,  patient lives with spouse in a(n) SS with 0 steps to enter.   Per patient, patient was independent with ADLS and used no DME for ambulation.  Per patient, the patient will have assistance from spouse upon discharge.  Discharge Plan A Home with family and Discharge Plan B Home with family.  Discharge Planning Booklet given to patient/family and discussed.  All questions addressed.     PCP:  Primary Doctor No      Pharmacy:    [id number] - [place], [place] - [address] [address] AT [facility] [address]  [address]  [facility] [address]  Phone: [phone] Fax: [phone]        Emergency Contacts:  Extended Emergency Contact Information  Primary Emergency Contact: Maritza Rosen  Home Phone: [phone]  Relation: Spouse      Insurance:    Payor: BLUE CROSS BLUE SHIELD / Plan: BCBS ALL OUT OF STATE / Product Type: [facility] /        09/28/20 1418   Discharge Assessment   Assessment Type Discharge Planning Assessment   Confirmed/corrected address and phone number on facesheet? Yes   Assessment information obtained from? Patient   Expected Length of Stay (days) 2   Communicated expected length of stay with patient/caregiver yes   Prior to hospitilization cognitive status: Alert/Oriented;No Deficits   Prior to hospitalization functional status: Independent   Current cognitive status: Alert/Oriented;No Deficits   Current Functional Status: Independent   Lives With spouse   Able to Return to Prior Arrangements yes   Is patient able to care for self after discharge? Yes   Who are your caregiver(s) and their phone number(s)? Maritza Rosen spouse [phone]   Patient's perception of discharge disposition home or selfcare   Readmission Within the Last 30 Days no previous admission in last 30 days   Patient currently being followed by outpatient case management? No   Patient currently receives any other  outside agency services? No   Equipment Currently Used at Home none   Do you have any problems affording any of your prescribed medications? TBD   Is the patient taking medications as prescribed? yes   Does the patient have transportation home? Yes   Transportation Anticipated family or friend will provide  (spouse)   Dialysis Name and Scheduled days na   Does the patient receive services at the Coumadin Clinic? No   Discharge Plan A Home with family   Discharge Plan B Home with family   DME Needed Upon Discharge  none   Patient/Family in Agreement with Plan yes       Neelam Walker RN  Case Management  Ext: [phone]

## 2020-09-29 ENCOUNTER — PATIENT OUTREACH (OUTPATIENT)
Dept: ADMINISTRATIVE | Facility: CLINIC | Age: 55
End: 2020-09-29

## 2020-09-29 ENCOUNTER — TELEPHONE (OUTPATIENT)
Dept: NEUROLOGY | Facility: CLINIC | Age: 55
End: 2020-09-29

## 2020-09-29 NOTE — PATIENT INSTRUCTIONS
Discharge Instructions for Stroke  You have been diagnosed with a stroke, or with a TIA (transient ischemic attack). Or you have been identified as having a high risk for stroke. During a stroke, blood stops flowing to part of your brain. This can damage areas in the brain that control other parts of the body. Symptoms after a stroke depend on which part of the brain has been affected.  Stroke risk factors  Once youve had a stroke, youre at greater risk for another one. Listed below are some other factors that can increase your risk for a stroke:  · High blood pressure  · High cholesterol  · Cigarette or cigar smoking  · Diabetes  · Carotid or other artery disease  · Atrial fibrillation, atrial flutter, or other heart disease  · Not being physically active  · Obesity  · Certain blood disorders (such as sickle cell anemia)  · Excessive alcohol use  · Abuse of street drugs  · Race  · Gender  · Family history of stroke  · Diet high in salty, fried, or greasy foods  Changes in daily living  Doing your regular tasks may be difficult after youve had a stroke, but you can learn new ways to manage your daily activities. In fact, doing daily activities may help you to regain muscle strength and bring back function to affected limbs. Be patient, give yourself time to adjust, and appreciate the progress you make.  Daily activities  You may be at risk of falling. Make changes to your home to help you walk more easily. A therapist will decide if you need an assistive device to walk safely.  You may need to see an occupational therapist or physical therapist to learn new ways of doing things. For example, you may need to make adjustments when bathing or dressing:  Tips for showering or bathing  · Test the water temperature with a hand or foot that was not affected by the stroke.  · Use grab bars, a shower seat, a hand-held showerhead, and a long-handled brush.  Tips for getting dressed  · Dress while sitting, starting with  the affected side or limb.  · Wear shirts that pull easily over your head. Wear pants or skirts with elastic waistbands.  · Use zippers with loops attached to the pull tabs.  Lifestyle changes  · Take your medicines exactly as directed. Dont skip doses.  · Begin an exercise program. Ask your provider how to get started. Also ask how much activity you should try to get on a daily or weekly basis. You can benefit from simple activities such as walking or gardening.  · Limit alcohol intake. Men should have no more than 2 alcoholic drinks a day. Women should limit themselves to 1 alcoholic drink per day.  · Know your cholesterol level. Follow your providers recommendations about how to keep cholesterol under control.  · If you are a smoker, quit now. Join a stop-smoking program to improve your chances of success. Ask your provider about medicines or other methods to help you quit.  · Learn stress management techniques to help you deal with stress in your home and work life.  Diet  Your healthcare provider will give you information on dietary changes that you may need to make, based on your situation. Your provider may recommend that you see a registered dietitian for help with diet changes. Changes may include:  · Reducing fat and cholesterol intake  · Reducing salt (sodium) intake, especially if you have high blood pressure  · Eating more fresh vegetables and fruits  · Eating more lean proteins, such as fish, poultry, and beans and peas (legumes)  · Eating less red meat and processed meats  · Using low-fat dairy products  · Limiting vegetable oils and nut oils  · Limiting sweets and processed foods such as chips, cookies, and baked goods  Follow-up care  · Keep your medical appointments. Close follow-up is important to stroke rehabilitation and recovery.  · Some medicines require blood tests to check for progress or problems. Keep follow-up appointments for any blood tests ordered by your providers.  When to call  911  Call 911 right away if you have any of the following symptoms of stroke:  · Weakness, tingling, or loss of feeling on one side of your face or body  · Sudden double vision or trouble seeing in one or both eyes  · Sudden trouble talking or slurred speech  · Trouble understanding others  · Sudden, severe headache  · Dizziness, loss of balance, or a sense of falling  · Blackouts or seizures      F.A.S.T. is an easy way to remember the signs of stroke. When you see these signs, you know that you need to call 911 fast.  F.A.S.T. stands for:  · F is for face drooping. One side of the face is drooping or numb. When the person smiles, the smile is uneven.  · A is for arm weakness. One arm is weak or numb. When the person lifts both arms at the same time, one arm may drift downward.  · S is for speech difficulty. You may notice slurred speech or trouble speaking. The person can't repeat a simple sentence correctly when asked.  · T is for time to call 911. If someone shows any of these symptoms, even if they go away, call 911 immediately. Make note of the time the symptoms first appeared.  Date Last Reviewed: 8/26/2015 © 2000-2017 [employer] [address] [place] [address], [place] [address]. All rights reserved. This information is not intended as a substitute for professional medical care. Always follow your healthcare professional's instructions.

## 2020-10-08 NOTE — TELEPHONE ENCOUNTER
Called wife and she stated they are being followed locally and will not be returning to [place].

## 2023-09-01 NOTE — HPI
"Dane Rosen is a 55 y.o. male with PMHx skin cancer who is being evaluated by the Vascular Neurology service after developing left side weakness, left facial droop and slurred speech. Pt was LKN at approximately 7:30 am. Adalid had been alone in office, went to drink and had difficulty swallowing. After coughing spell, he suddenly became weak on left side. He then noticed his speech was slurred and face was drooping on the left side. Patient called his wife. EMS was activated. Patient was transported to [facility]. Telestroke consult done- tPA given. CTA completed at the outside hospital with possible RM1 occlusion. [place] [facility] [place] possible [place] intervention.     Pt denies personal hx blood clots, denies hx cardiac arrhythmia and denies use of antithrombotics at home Patient reports being told he has "thick blood" when having labs drawn. He lives with others and performs all ADLs independently. Pt drinks alcohol occasionally.      "
Dane Rosen is a 55 year old male who was at work this morning [id number] when he had some dysphagia while drinking. When he stood up he noticed some left sided weakness and was slurring his words. LKN 0715 He presented to [facility] and was noted to have slurred speech,, facial droop, and ataxia. CT head w/o was negative for acute bleeding but concerning for right MCA stroke. Teleconsult was performed and tPA was adminstered. Patient was transferred to [facility] for higher level of care. Repeat CT revealed a subtle hypoattenuation of the right lateral frontal lobe and operculum. In the ED post tPA the patient is at his baseline. He is alert and oriented to time, person, place, and situation. He is no longer slurring his words. Strength 5/5 in all extremeties. Minor left sided facial droop resolved by the time the patient was staffed. He will be admitted to the [facility] for close monitoring s/p tPA.   
none